# Patient Record
Sex: MALE | Race: WHITE | NOT HISPANIC OR LATINO | Employment: PART TIME | URBAN - METROPOLITAN AREA
[De-identification: names, ages, dates, MRNs, and addresses within clinical notes are randomized per-mention and may not be internally consistent; named-entity substitution may affect disease eponyms.]

---

## 2018-06-19 ENCOUNTER — NON-PROVIDER VISIT (OUTPATIENT)
Dept: URGENT CARE | Facility: CLINIC | Age: 28
End: 2018-06-19

## 2018-06-19 DIAGNOSIS — Z02.1 PRE-EMPLOYMENT DRUG SCREENING: ICD-10-CM

## 2018-06-19 LAB
AMP AMPHETAMINE: NORMAL
COC COCAINE: NORMAL
INT CON NEG: NORMAL
INT CON POS: NORMAL
MET METHAMPHETAMINES: NORMAL
OPI OPIATES: NORMAL
PCP PHENCYCLIDINE: NORMAL
POC DRUG COMMENT 753798-OCCUPATIONAL HEALTH: NEGATIVE
THC: NORMAL

## 2018-06-19 PROCEDURE — 80305 DRUG TEST PRSMV DIR OPT OBS: CPT | Performed by: PHYSICIAN ASSISTANT

## 2018-06-28 ENCOUNTER — HOSPITAL ENCOUNTER (OUTPATIENT)
Facility: MEDICAL CENTER | Age: 28
End: 2018-07-01
Attending: EMERGENCY MEDICINE | Admitting: INTERNAL MEDICINE
Payer: MEDICAID

## 2018-06-28 ENCOUNTER — APPOINTMENT (OUTPATIENT)
Dept: RADIOLOGY | Facility: MEDICAL CENTER | Age: 28
End: 2018-06-28
Attending: EMERGENCY MEDICINE
Payer: MEDICAID

## 2018-06-28 DIAGNOSIS — L02.91 ABSCESS: ICD-10-CM

## 2018-06-28 DIAGNOSIS — L03.116 CELLULITIS OF LEFT LOWER EXTREMITY: ICD-10-CM

## 2018-06-28 PROBLEM — L03.114 CELLULITIS OF LEFT UPPER EXTREMITY: Status: ACTIVE | Noted: 2018-06-28

## 2018-06-28 PROBLEM — M71.062 ABSCESS OF BURSA OF LEFT KNEE: Status: ACTIVE | Noted: 2018-06-28

## 2018-06-28 PROBLEM — E87.6 HYPOKALEMIA: Status: ACTIVE | Noted: 2018-06-28

## 2018-06-28 PROBLEM — D72.829 LEUKOCYTOSIS: Status: ACTIVE | Noted: 2018-06-28

## 2018-06-28 LAB
ALBUMIN SERPL BCP-MCNC: 4 G/DL (ref 3.2–4.9)
ALBUMIN/GLOB SERPL: 1.4 G/DL
ALP SERPL-CCNC: 82 U/L (ref 30–99)
ALT SERPL-CCNC: 13 U/L (ref 2–50)
AMPHET UR QL SCN: NEGATIVE
ANION GAP SERPL CALC-SCNC: 11 MMOL/L (ref 0–11.9)
APTT PPP: 27.1 SEC (ref 24.7–36)
AST SERPL-CCNC: 17 U/L (ref 12–45)
BARBITURATES UR QL SCN: NEGATIVE
BASOPHILS # BLD AUTO: 0.2 % (ref 0–1.8)
BASOPHILS # BLD: 0.02 K/UL (ref 0–0.12)
BENZODIAZ UR QL SCN: NEGATIVE
BILIRUB SERPL-MCNC: 0.5 MG/DL (ref 0.1–1.5)
BUN SERPL-MCNC: 17 MG/DL (ref 8–22)
BZE UR QL SCN: NEGATIVE
CALCIUM SERPL-MCNC: 8.8 MG/DL (ref 8.5–10.5)
CANNABINOIDS UR QL SCN: POSITIVE
CHLORIDE SERPL-SCNC: 108 MMOL/L (ref 96–112)
CO2 SERPL-SCNC: 24 MMOL/L (ref 20–33)
CREAT SERPL-MCNC: 0.9 MG/DL (ref 0.5–1.4)
CRP SERPL HS-MCNC: 0.78 MG/DL (ref 0–0.75)
EOSINOPHIL # BLD AUTO: 0.1 K/UL (ref 0–0.51)
EOSINOPHIL NFR BLD: 0.9 % (ref 0–6.9)
ERYTHROCYTE [DISTWIDTH] IN BLOOD BY AUTOMATED COUNT: 37.6 FL (ref 35.9–50)
ERYTHROCYTE [SEDIMENTATION RATE] IN BLOOD BY WESTERGREN METHOD: 9 MM/HOUR (ref 0–15)
GLOBULIN SER CALC-MCNC: 2.9 G/DL (ref 1.9–3.5)
GLUCOSE SERPL-MCNC: 98 MG/DL (ref 65–99)
HCT VFR BLD AUTO: 46.3 % (ref 42–52)
HGB BLD-MCNC: 16 G/DL (ref 14–18)
IMM GRANULOCYTES # BLD AUTO: 0.04 K/UL (ref 0–0.11)
IMM GRANULOCYTES NFR BLD AUTO: 0.4 % (ref 0–0.9)
INR PPP: 1.08 (ref 0.87–1.13)
LACTATE BLD-SCNC: 1.5 MMOL/L (ref 0.5–2)
LYMPHOCYTES # BLD AUTO: 2.04 K/UL (ref 1–4.8)
LYMPHOCYTES NFR BLD: 18.2 % (ref 22–41)
MCH RBC QN AUTO: 29.9 PG (ref 27–33)
MCHC RBC AUTO-ENTMCNC: 34.6 G/DL (ref 33.7–35.3)
MCV RBC AUTO: 86.5 FL (ref 81.4–97.8)
METHADONE UR QL SCN: NEGATIVE
MONOCYTES # BLD AUTO: 0.64 K/UL (ref 0–0.85)
MONOCYTES NFR BLD AUTO: 5.7 % (ref 0–13.4)
NEUTROPHILS # BLD AUTO: 8.37 K/UL (ref 1.82–7.42)
NEUTROPHILS NFR BLD: 74.6 % (ref 44–72)
NRBC # BLD AUTO: 0 K/UL
NRBC BLD-RTO: 0 /100 WBC
OPIATES UR QL SCN: NEGATIVE
OXYCODONE UR QL SCN: POSITIVE
PCP UR QL SCN: NEGATIVE
PLATELET # BLD AUTO: 266 K/UL (ref 164–446)
PMV BLD AUTO: 10.6 FL (ref 9–12.9)
POTASSIUM SERPL-SCNC: 3.3 MMOL/L (ref 3.6–5.5)
PROPOXYPH UR QL SCN: NEGATIVE
PROT SERPL-MCNC: 6.9 G/DL (ref 6–8.2)
PROTHROMBIN TIME: 13.7 SEC (ref 12–14.6)
RBC # BLD AUTO: 5.35 M/UL (ref 4.7–6.1)
SODIUM SERPL-SCNC: 143 MMOL/L (ref 135–145)
WBC # BLD AUTO: 11.2 K/UL (ref 4.8–10.8)

## 2018-06-28 PROCEDURE — 87070 CULTURE OTHR SPECIMN AEROBIC: CPT

## 2018-06-28 PROCEDURE — 96365 THER/PROPH/DIAG IV INF INIT: CPT

## 2018-06-28 PROCEDURE — 83605 ASSAY OF LACTIC ACID: CPT

## 2018-06-28 PROCEDURE — G0378 HOSPITAL OBSERVATION PER HR: HCPCS

## 2018-06-28 PROCEDURE — 85610 PROTHROMBIN TIME: CPT

## 2018-06-28 PROCEDURE — 86140 C-REACTIVE PROTEIN: CPT

## 2018-06-28 PROCEDURE — 80053 COMPREHEN METABOLIC PANEL: CPT

## 2018-06-28 PROCEDURE — 700105 HCHG RX REV CODE 258: Performed by: EMERGENCY MEDICINE

## 2018-06-28 PROCEDURE — 85730 THROMBOPLASTIN TIME PARTIAL: CPT

## 2018-06-28 PROCEDURE — 73562 X-RAY EXAM OF KNEE 3: CPT | Mod: LT

## 2018-06-28 PROCEDURE — 99285 EMERGENCY DEPT VISIT HI MDM: CPT

## 2018-06-28 PROCEDURE — 96375 TX/PRO/DX INJ NEW DRUG ADDON: CPT

## 2018-06-28 PROCEDURE — 87205 SMEAR GRAM STAIN: CPT

## 2018-06-28 PROCEDURE — 85652 RBC SED RATE AUTOMATED: CPT

## 2018-06-28 PROCEDURE — 96367 TX/PROPH/DG ADDL SEQ IV INF: CPT

## 2018-06-28 PROCEDURE — 36415 COLL VENOUS BLD VENIPUNCTURE: CPT

## 2018-06-28 PROCEDURE — 85025 COMPLETE CBC W/AUTO DIFF WBC: CPT

## 2018-06-28 PROCEDURE — 700111 HCHG RX REV CODE 636 W/ 250 OVERRIDE (IP): Performed by: EMERGENCY MEDICINE

## 2018-06-28 PROCEDURE — 80307 DRUG TEST PRSMV CHEM ANLYZR: CPT

## 2018-06-28 PROCEDURE — 87077 CULTURE AEROBIC IDENTIFY: CPT

## 2018-06-28 PROCEDURE — 700105 HCHG RX REV CODE 258: Performed by: INTERNAL MEDICINE

## 2018-06-28 PROCEDURE — 99220 PR INITIAL OBSERVATION CARE,LEVL III: CPT | Performed by: INTERNAL MEDICINE

## 2018-06-28 PROCEDURE — 87040 BLOOD CULTURE FOR BACTERIA: CPT

## 2018-06-28 PROCEDURE — 87186 SC STD MICRODIL/AGAR DIL: CPT

## 2018-06-28 RX ORDER — ONDANSETRON 2 MG/ML
4 INJECTION INTRAMUSCULAR; INTRAVENOUS EVERY 4 HOURS PRN
Status: DISCONTINUED | OUTPATIENT
Start: 2018-06-28 | End: 2018-07-01 | Stop reason: HOSPADM

## 2018-06-28 RX ORDER — ONDANSETRON 4 MG/1
4 TABLET, ORALLY DISINTEGRATING ORAL EVERY 4 HOURS PRN
Status: DISCONTINUED | OUTPATIENT
Start: 2018-06-28 | End: 2018-07-01 | Stop reason: HOSPADM

## 2018-06-28 RX ORDER — BISACODYL 10 MG
10 SUPPOSITORY, RECTAL RECTAL
Status: DISCONTINUED | OUTPATIENT
Start: 2018-06-28 | End: 2018-06-29

## 2018-06-28 RX ORDER — AMOXICILLIN 250 MG
2 CAPSULE ORAL 2 TIMES DAILY
Status: DISCONTINUED | OUTPATIENT
Start: 2018-06-29 | End: 2018-06-29

## 2018-06-28 RX ORDER — SODIUM CHLORIDE 9 MG/ML
500 INJECTION, SOLUTION INTRAVENOUS
Status: DISCONTINUED | OUTPATIENT
Start: 2018-06-28 | End: 2018-07-01 | Stop reason: HOSPADM

## 2018-06-28 RX ORDER — SODIUM CHLORIDE 9 MG/ML
30 INJECTION, SOLUTION INTRAVENOUS
Status: DISCONTINUED | OUTPATIENT
Start: 2018-06-28 | End: 2018-07-01 | Stop reason: HOSPADM

## 2018-06-28 RX ORDER — PROMETHAZINE HYDROCHLORIDE 12.5 MG/1
12.5-25 SUPPOSITORY RECTAL EVERY 4 HOURS PRN
Status: DISCONTINUED | OUTPATIENT
Start: 2018-06-28 | End: 2018-07-01 | Stop reason: HOSPADM

## 2018-06-28 RX ORDER — SODIUM CHLORIDE 9 MG/ML
INJECTION, SOLUTION INTRAVENOUS CONTINUOUS
Status: DISCONTINUED | OUTPATIENT
Start: 2018-06-28 | End: 2018-06-30

## 2018-06-28 RX ORDER — ACETAMINOPHEN 325 MG/1
650 TABLET ORAL EVERY 6 HOURS PRN
Status: DISCONTINUED | OUTPATIENT
Start: 2018-06-28 | End: 2018-06-29

## 2018-06-28 RX ORDER — LIDOCAINE HYDROCHLORIDE AND EPINEPHRINE 10; 10 MG/ML; UG/ML
20 INJECTION, SOLUTION INFILTRATION; PERINEURAL ONCE
Status: DISCONTINUED | OUTPATIENT
Start: 2018-06-28 | End: 2018-06-29

## 2018-06-28 RX ORDER — HYDROCODONE BITARTRATE AND ACETAMINOPHEN 5; 325 MG/1; MG/1
1-2 TABLET ORAL EVERY 6 HOURS PRN
Status: DISCONTINUED | OUTPATIENT
Start: 2018-06-28 | End: 2018-07-01 | Stop reason: HOSPADM

## 2018-06-28 RX ORDER — POLYETHYLENE GLYCOL 3350 17 G/17G
1 POWDER, FOR SOLUTION ORAL
Status: DISCONTINUED | OUTPATIENT
Start: 2018-06-28 | End: 2018-06-29

## 2018-06-28 RX ORDER — PROMETHAZINE HYDROCHLORIDE 25 MG/1
12.5-25 TABLET ORAL EVERY 4 HOURS PRN
Status: DISCONTINUED | OUTPATIENT
Start: 2018-06-28 | End: 2018-07-01 | Stop reason: HOSPADM

## 2018-06-28 RX ORDER — MORPHINE SULFATE 10 MG/ML
8 INJECTION, SOLUTION INTRAMUSCULAR; INTRAVENOUS ONCE
Status: COMPLETED | OUTPATIENT
Start: 2018-06-28 | End: 2018-06-28

## 2018-06-28 RX ADMIN — MORPHINE SULFATE 8 MG: 10 INJECTION INTRAVENOUS at 21:11

## 2018-06-28 RX ADMIN — VANCOMYCIN HYDROCHLORIDE 2100 MG: 100 INJECTION, POWDER, LYOPHILIZED, FOR SOLUTION INTRAVENOUS at 23:12

## 2018-06-28 RX ADMIN — SODIUM CHLORIDE: 9 INJECTION, SOLUTION INTRAVENOUS at 23:33

## 2018-06-28 RX ADMIN — SODIUM CHLORIDE 3 G: 900 INJECTION INTRAVENOUS at 21:10

## 2018-06-28 ASSESSMENT — COPD QUESTIONNAIRES
DO YOU EVER COUGH UP ANY MUCUS OR PHLEGM?: NO/ONLY WITH OCCASIONAL COLDS OR INFECTIONS
HAVE YOU SMOKED AT LEAST 100 CIGARETTES IN YOUR ENTIRE LIFE: NO/DON'T KNOW
DURING THE PAST 4 WEEKS HOW MUCH DID YOU FEEL SHORT OF BREATH: NONE/LITTLE OF THE TIME
COPD SCREENING SCORE: 0

## 2018-06-28 ASSESSMENT — ENCOUNTER SYMPTOMS
BACK PAIN: 0
SPUTUM PRODUCTION: 0
FEVER: 1
HEADACHES: 0
CHILLS: 1
NECK PAIN: 0
BRUISES/BLEEDS EASILY: 0
FLANK PAIN: 0
ABDOMINAL PAIN: 0
COUGH: 0
BLURRED VISION: 0
DIARRHEA: 0
VOMITING: 0
PALPITATIONS: 0
DIZZINESS: 0
SHORTNESS OF BREATH: 0
WHEEZING: 0
BLOOD IN STOOL: 0
SORE THROAT: 0
NAUSEA: 0
FOCAL WEAKNESS: 0
MYALGIAS: 0
DIAPHORESIS: 0
SEIZURES: 0

## 2018-06-28 ASSESSMENT — LIFESTYLE VARIABLES: EVER_SMOKED: NEVER

## 2018-06-28 ASSESSMENT — PAIN SCALES - GENERAL: PAINLEVEL_OUTOF10: 7

## 2018-06-29 PROBLEM — L02.416 ABSCESS OF LEFT KNEE: Status: ACTIVE | Noted: 2018-06-28

## 2018-06-29 LAB
GRAM STN SPEC: NORMAL
GRAM STN SPEC: NORMAL
SIGNIFICANT IND 70042: NORMAL
SIGNIFICANT IND 70042: NORMAL
SITE SITE: NORMAL
SITE SITE: NORMAL
SOURCE SOURCE: NORMAL
SOURCE SOURCE: NORMAL

## 2018-06-29 PROCEDURE — A9270 NON-COVERED ITEM OR SERVICE: HCPCS

## 2018-06-29 PROCEDURE — 700111 HCHG RX REV CODE 636 W/ 250 OVERRIDE (IP)

## 2018-06-29 PROCEDURE — 160035 HCHG PACU - 1ST 60 MINS PHASE I: Performed by: ORTHOPAEDIC SURGERY

## 2018-06-29 PROCEDURE — 87075 CULTR BACTERIA EXCEPT BLOOD: CPT

## 2018-06-29 PROCEDURE — A9270 NON-COVERED ITEM OR SERVICE: HCPCS | Performed by: INTERNAL MEDICINE

## 2018-06-29 PROCEDURE — 99225 PR SUBSEQUENT OBSERVATION CARE,LEVEL II: CPT | Performed by: HOSPITALIST

## 2018-06-29 PROCEDURE — 160009 HCHG ANES TIME/MIN: Performed by: ORTHOPAEDIC SURGERY

## 2018-06-29 PROCEDURE — 700101 HCHG RX REV CODE 250

## 2018-06-29 PROCEDURE — A9270 NON-COVERED ITEM OR SERVICE: HCPCS | Performed by: ORTHOPAEDIC SURGERY

## 2018-06-29 PROCEDURE — 160002 HCHG RECOVERY MINUTES (STAT): Performed by: ORTHOPAEDIC SURGERY

## 2018-06-29 PROCEDURE — 87205 SMEAR GRAM STAIN: CPT

## 2018-06-29 PROCEDURE — 160048 HCHG OR STATISTICAL LEVEL 1-5: Performed by: ORTHOPAEDIC SURGERY

## 2018-06-29 PROCEDURE — 700111 HCHG RX REV CODE 636 W/ 250 OVERRIDE (IP): Performed by: INTERNAL MEDICINE

## 2018-06-29 PROCEDURE — 96376 TX/PRO/DX INJ SAME DRUG ADON: CPT

## 2018-06-29 PROCEDURE — 87070 CULTURE OTHR SPECIMN AEROBIC: CPT

## 2018-06-29 PROCEDURE — 700112 HCHG RX REV CODE 229: Performed by: ORTHOPAEDIC SURGERY

## 2018-06-29 PROCEDURE — 700102 HCHG RX REV CODE 250 W/ 637 OVERRIDE(OP): Performed by: ORTHOPAEDIC SURGERY

## 2018-06-29 PROCEDURE — G0378 HOSPITAL OBSERVATION PER HR: HCPCS

## 2018-06-29 PROCEDURE — 700111 HCHG RX REV CODE 636 W/ 250 OVERRIDE (IP): Performed by: ORTHOPAEDIC SURGERY

## 2018-06-29 PROCEDURE — 500891 HCHG PACK, ORTHO MAJOR: Performed by: ORTHOPAEDIC SURGERY

## 2018-06-29 PROCEDURE — 501838 HCHG SUTURE GENERAL: Performed by: ORTHOPAEDIC SURGERY

## 2018-06-29 PROCEDURE — 90715 TDAP VACCINE 7 YRS/> IM: CPT | Performed by: HOSPITALIST

## 2018-06-29 PROCEDURE — 160027 HCHG SURGERY MINUTES - 1ST 30 MINS LEVEL 2: Performed by: ORTHOPAEDIC SURGERY

## 2018-06-29 PROCEDURE — 87389 HIV-1 AG W/HIV-1&-2 AB AG IA: CPT

## 2018-06-29 PROCEDURE — 99245 OFF/OP CONSLTJ NEW/EST HI 55: CPT | Performed by: INTERNAL MEDICINE

## 2018-06-29 PROCEDURE — 700102 HCHG RX REV CODE 250 W/ 637 OVERRIDE(OP): Performed by: INTERNAL MEDICINE

## 2018-06-29 PROCEDURE — 700102 HCHG RX REV CODE 250 W/ 637 OVERRIDE(OP)

## 2018-06-29 PROCEDURE — 700111 HCHG RX REV CODE 636 W/ 250 OVERRIDE (IP): Performed by: HOSPITALIST

## 2018-06-29 PROCEDURE — 700105 HCHG RX REV CODE 258: Performed by: INTERNAL MEDICINE

## 2018-06-29 PROCEDURE — 96366 THER/PROPH/DIAG IV INF ADDON: CPT

## 2018-06-29 PROCEDURE — 96375 TX/PRO/DX INJ NEW DRUG ADDON: CPT

## 2018-06-29 PROCEDURE — 160036 HCHG PACU - EA ADDL 30 MINS PHASE I: Performed by: ORTHOPAEDIC SURGERY

## 2018-06-29 PROCEDURE — 36415 COLL VENOUS BLD VENIPUNCTURE: CPT

## 2018-06-29 PROCEDURE — 87015 SPECIMEN INFECT AGNT CONCNTJ: CPT

## 2018-06-29 PROCEDURE — 90471 IMMUNIZATION ADMIN: CPT

## 2018-06-29 PROCEDURE — 86803 HEPATITIS C AB TEST: CPT

## 2018-06-29 RX ORDER — MORPHINE SULFATE 4 MG/ML
4 INJECTION, SOLUTION INTRAMUSCULAR; INTRAVENOUS EVERY 4 HOURS PRN
Status: DISCONTINUED | OUTPATIENT
Start: 2018-06-29 | End: 2018-07-01 | Stop reason: HOSPADM

## 2018-06-29 RX ORDER — MAGNESIUM HYDROXIDE 1200 MG/15ML
LIQUID ORAL
Status: COMPLETED | OUTPATIENT
Start: 2018-06-29 | End: 2018-06-29

## 2018-06-29 RX ORDER — DIPHENHYDRAMINE HYDROCHLORIDE 50 MG/ML
25 INJECTION INTRAMUSCULAR; INTRAVENOUS EVERY 6 HOURS PRN
Status: DISCONTINUED | OUTPATIENT
Start: 2018-06-29 | End: 2018-07-01 | Stop reason: HOSPADM

## 2018-06-29 RX ORDER — KETOROLAC TROMETHAMINE 30 MG/ML
30 INJECTION, SOLUTION INTRAMUSCULAR; INTRAVENOUS EVERY 6 HOURS
Status: DISCONTINUED | OUTPATIENT
Start: 2018-06-29 | End: 2018-07-01 | Stop reason: HOSPADM

## 2018-06-29 RX ORDER — SCOLOPAMINE TRANSDERMAL SYSTEM 1 MG/1
1 PATCH, EXTENDED RELEASE TRANSDERMAL
Status: DISCONTINUED | OUTPATIENT
Start: 2018-06-29 | End: 2018-07-01 | Stop reason: HOSPADM

## 2018-06-29 RX ORDER — AMOXICILLIN 250 MG
1 CAPSULE ORAL
Status: DISCONTINUED | OUTPATIENT
Start: 2018-06-29 | End: 2018-07-01 | Stop reason: HOSPADM

## 2018-06-29 RX ORDER — OXYCODONE HCL 5 MG/5 ML
SOLUTION, ORAL ORAL
Status: COMPLETED
Start: 2018-06-29 | End: 2018-06-29

## 2018-06-29 RX ORDER — BISACODYL 10 MG
10 SUPPOSITORY, RECTAL RECTAL
Status: DISCONTINUED | OUTPATIENT
Start: 2018-06-29 | End: 2018-07-01 | Stop reason: HOSPADM

## 2018-06-29 RX ORDER — AMOXICILLIN 250 MG
1 CAPSULE ORAL NIGHTLY
Status: DISCONTINUED | OUTPATIENT
Start: 2018-06-29 | End: 2018-07-01 | Stop reason: HOSPADM

## 2018-06-29 RX ORDER — POLYETHYLENE GLYCOL 3350 17 G/17G
1 POWDER, FOR SOLUTION ORAL 2 TIMES DAILY PRN
Status: DISCONTINUED | OUTPATIENT
Start: 2018-06-29 | End: 2018-07-01 | Stop reason: HOSPADM

## 2018-06-29 RX ORDER — ENEMA 19; 7 G/133ML; G/133ML
1 ENEMA RECTAL
Status: DISCONTINUED | OUTPATIENT
Start: 2018-06-29 | End: 2018-07-01 | Stop reason: HOSPADM

## 2018-06-29 RX ORDER — ACETAMINOPHEN 325 MG/1
650 TABLET ORAL EVERY 6 HOURS
Status: DISCONTINUED | OUTPATIENT
Start: 2018-06-29 | End: 2018-07-01 | Stop reason: HOSPADM

## 2018-06-29 RX ORDER — ONDANSETRON 2 MG/ML
4 INJECTION INTRAMUSCULAR; INTRAVENOUS EVERY 4 HOURS PRN
Status: DISCONTINUED | OUTPATIENT
Start: 2018-06-29 | End: 2018-06-29

## 2018-06-29 RX ORDER — OXYCODONE HYDROCHLORIDE 5 MG/1
5 TABLET ORAL
Status: DISCONTINUED | OUTPATIENT
Start: 2018-06-29 | End: 2018-07-01 | Stop reason: HOSPADM

## 2018-06-29 RX ORDER — HALOPERIDOL 5 MG/ML
1 INJECTION INTRAMUSCULAR EVERY 6 HOURS PRN
Status: DISCONTINUED | OUTPATIENT
Start: 2018-06-29 | End: 2018-07-01 | Stop reason: HOSPADM

## 2018-06-29 RX ORDER — DOCUSATE SODIUM 100 MG/1
100 CAPSULE, LIQUID FILLED ORAL 2 TIMES DAILY
Status: DISCONTINUED | OUTPATIENT
Start: 2018-06-29 | End: 2018-07-01 | Stop reason: HOSPADM

## 2018-06-29 RX ORDER — DEXAMETHASONE SODIUM PHOSPHATE 4 MG/ML
4 INJECTION, SOLUTION INTRA-ARTICULAR; INTRALESIONAL; INTRAMUSCULAR; INTRAVENOUS; SOFT TISSUE
Status: DISCONTINUED | OUTPATIENT
Start: 2018-06-29 | End: 2018-07-01 | Stop reason: HOSPADM

## 2018-06-29 RX ORDER — OXYCODONE HYDROCHLORIDE 10 MG/1
10 TABLET ORAL
Status: DISCONTINUED | OUTPATIENT
Start: 2018-06-29 | End: 2018-07-01 | Stop reason: HOSPADM

## 2018-06-29 RX ADMIN — AMPICILLIN SODIUM AND SULBACTAM SODIUM 3 G: 2; 1 INJECTION, POWDER, FOR SOLUTION INTRAMUSCULAR; INTRAVENOUS at 14:33

## 2018-06-29 RX ADMIN — HYDROCODONE BITARTRATE AND ACETAMINOPHEN 2 TABLET: 5; 325 TABLET ORAL at 06:56

## 2018-06-29 RX ADMIN — OXYCODONE HYDROCHLORIDE 10 MG: 5 SOLUTION ORAL at 10:40

## 2018-06-29 RX ADMIN — KETOROLAC TROMETHAMINE 30 MG: 30 INJECTION, SOLUTION INTRAMUSCULAR at 18:00

## 2018-06-29 RX ADMIN — FENTANYL CITRATE 50 MCG: 50 INJECTION, SOLUTION INTRAMUSCULAR; INTRAVENOUS at 10:40

## 2018-06-29 RX ADMIN — HYDROCODONE BITARTRATE AND ACETAMINOPHEN 2 TABLET: 5; 325 TABLET ORAL at 00:36

## 2018-06-29 RX ADMIN — AMPICILLIN SODIUM AND SULBACTAM SODIUM 3 G: 2; 1 INJECTION, POWDER, FOR SOLUTION INTRAMUSCULAR; INTRAVENOUS at 02:59

## 2018-06-29 RX ADMIN — CLOSTRIDIUM TETANI TOXOID ANTIGEN (FORMALDEHYDE INACTIVATED), CORYNEBACTERIUM DIPHTHERIAE TOXOID ANTIGEN (FORMALDEHYDE INACTIVATED), BORDETELLA PERTUSSIS TOXOID ANTIGEN (GLUTARALDEHYDE INACTIVATED), BORDETELLA PERTUSSIS FILAMENTOUS HEMAGGLUTININ ANTIGEN (FORMALDEHYDE INACTIVATED), BORDETELLA PERTUSSIS PERTACTIN ANTIGEN, AND BORDETELLA PERTUSSIS FIMBRIAE 2/3 ANTIGEN 0.5 ML: 5; 2; 2.5; 5; 3; 5 INJECTION, SUSPENSION INTRAMUSCULAR at 22:13

## 2018-06-29 RX ADMIN — HYDROMORPHONE HYDROCHLORIDE 0.5 MG: 10 INJECTION, SOLUTION INTRAMUSCULAR; INTRAVENOUS; SUBCUTANEOUS at 10:57

## 2018-06-29 RX ADMIN — OXYCODONE HYDROCHLORIDE 10 MG: 10 TABLET ORAL at 23:15

## 2018-06-29 RX ADMIN — VANCOMYCIN HYDROCHLORIDE 1300 MG: 100 INJECTION, POWDER, LYOPHILIZED, FOR SOLUTION INTRAVENOUS at 15:09

## 2018-06-29 RX ADMIN — MORPHINE SULFATE 4 MG: 4 INJECTION INTRAVENOUS at 03:45

## 2018-06-29 RX ADMIN — ACETAMINOPHEN 650 MG: 325 TABLET, FILM COATED ORAL at 23:15

## 2018-06-29 RX ADMIN — KETOROLAC TROMETHAMINE 30 MG: 30 INJECTION, SOLUTION INTRAMUSCULAR at 13:13

## 2018-06-29 RX ADMIN — OXYCODONE HYDROCHLORIDE 10 MG: 10 TABLET ORAL at 17:04

## 2018-06-29 RX ADMIN — VANCOMYCIN HYDROCHLORIDE 1300 MG: 100 INJECTION, POWDER, LYOPHILIZED, FOR SOLUTION INTRAVENOUS at 05:54

## 2018-06-29 RX ADMIN — STANDARDIZED SENNA CONCENTRATE AND DOCUSATE SODIUM 1 TABLET: 8.6; 5 TABLET, FILM COATED ORAL at 19:53

## 2018-06-29 RX ADMIN — ACETAMINOPHEN 650 MG: 325 TABLET, FILM COATED ORAL at 13:13

## 2018-06-29 RX ADMIN — AMPICILLIN SODIUM AND SULBACTAM SODIUM 3 G: 2; 1 INJECTION, POWDER, FOR SOLUTION INTRAMUSCULAR; INTRAVENOUS at 19:53

## 2018-06-29 RX ADMIN — VANCOMYCIN HYDROCHLORIDE 1300 MG: 100 INJECTION, POWDER, LYOPHILIZED, FOR SOLUTION INTRAVENOUS at 22:13

## 2018-06-29 RX ADMIN — OXYCODONE HYDROCHLORIDE 10 MG: 10 TABLET ORAL at 12:25

## 2018-06-29 RX ADMIN — ACETAMINOPHEN 650 MG: 325 TABLET, FILM COATED ORAL at 18:00

## 2018-06-29 RX ADMIN — FENTANYL CITRATE 50 MCG: 50 INJECTION, SOLUTION INTRAMUSCULAR; INTRAVENOUS at 10:50

## 2018-06-29 RX ADMIN — DOCUSATE SODIUM 100 MG: 100 CAPSULE ORAL at 13:13

## 2018-06-29 RX ADMIN — DOCUSATE SODIUM 100 MG: 100 CAPSULE ORAL at 19:53

## 2018-06-29 RX ADMIN — OXYCODONE HYDROCHLORIDE 10 MG: 10 TABLET ORAL at 19:54

## 2018-06-29 RX ADMIN — KETOROLAC TROMETHAMINE 30 MG: 30 INJECTION, SOLUTION INTRAMUSCULAR at 23:15

## 2018-06-29 RX ADMIN — HYDROMORPHONE HYDROCHLORIDE 0.5 MG: 10 INJECTION, SOLUTION INTRAMUSCULAR; INTRAVENOUS; SUBCUTANEOUS at 10:45

## 2018-06-29 ASSESSMENT — LIFESTYLE VARIABLES
TOTAL SCORE: 0
EVER HAD A DRINK FIRST THING IN THE MORNING TO STEADY YOUR NERVES TO GET RID OF A HANGOVER: NO
HAVE PEOPLE ANNOYED YOU BY CRITICIZING YOUR DRINKING: NO
HAVE YOU EVER FELT YOU SHOULD CUT DOWN ON YOUR DRINKING: NO
ALCOHOL_USE: YES
ALCOHOL_USE: YES
TOTAL SCORE: 0
CONSUMPTION TOTAL: INCOMPLETE
TOTAL SCORE: 0
SUBSTANCE_ABUSE: 0
HOW MANY TIMES IN THE PAST YEAR HAVE YOU HAD 5 OR MORE DRINKS IN A DAY: 0
EVER HAD A DRINK FIRST THING IN THE MORNING TO STEADY YOUR NERVES TO GET RID OF A HANGOVER: NO
EVER FELT BAD OR GUILTY ABOUT YOUR DRINKING: NO
EVER FELT BAD OR GUILTY ABOUT YOUR DRINKING: NO
CONSUMPTION TOTAL: NEGATIVE
HAVE YOU EVER FELT YOU SHOULD CUT DOWN ON YOUR DRINKING: NO
ON A TYPICAL DAY WHEN YOU DRINK ALCOHOL HOW MANY DRINKS DO YOU HAVE: 1
ON A TYPICAL DAY WHEN YOU DRINK ALCOHOL HOW MANY DRINKS DO YOU HAVE: 1
AVERAGE NUMBER OF DAYS PER WEEK YOU HAVE A DRINK CONTAINING ALCOHOL: 0
TOTAL SCORE: 0
HAVE PEOPLE ANNOYED YOU BY CRITICIZING YOUR DRINKING: NO

## 2018-06-29 ASSESSMENT — PATIENT HEALTH QUESTIONNAIRE - PHQ9
SUM OF ALL RESPONSES TO PHQ9 QUESTIONS 1 AND 2: 0
SUM OF ALL RESPONSES TO PHQ9 QUESTIONS 1 AND 2: 0
1. LITTLE INTEREST OR PLEASURE IN DOING THINGS: NOT AT ALL
2. FEELING DOWN, DEPRESSED, IRRITABLE, OR HOPELESS: NOT AT ALL
1. LITTLE INTEREST OR PLEASURE IN DOING THINGS: NOT AT ALL
2. FEELING DOWN, DEPRESSED, IRRITABLE, OR HOPELESS: NOT AT ALL

## 2018-06-29 ASSESSMENT — PAIN SCALES - GENERAL
PAINLEVEL_OUTOF10: 4
PAINLEVEL_OUTOF10: 8
PAINLEVEL_OUTOF10: 8
PAINLEVEL_OUTOF10: 9
PAINLEVEL_OUTOF10: 7
PAINLEVEL_OUTOF10: 5
PAINLEVEL_OUTOF10: 4
PAINLEVEL_OUTOF10: 3
PAINLEVEL_OUTOF10: 7
PAINLEVEL_OUTOF10: 3
PAINLEVEL_OUTOF10: 0
PAINLEVEL_OUTOF10: 7
PAINLEVEL_OUTOF10: 8
PAINLEVEL_OUTOF10: 7
PAINLEVEL_OUTOF10: 3
PAINLEVEL_OUTOF10: 5
PAINLEVEL_OUTOF10: 0
PAINLEVEL_OUTOF10: 7
PAINLEVEL_OUTOF10: 0

## 2018-06-29 ASSESSMENT — ENCOUNTER SYMPTOMS
DIARRHEA: 0
SORE THROAT: 0
SHORTNESS OF BREATH: 0
ABDOMINAL PAIN: 0
NECK PAIN: 0
EYE PAIN: 0
HEADACHES: 0
DIAPHORESIS: 0
COUGH: 0
NAUSEA: 0
SENSORY CHANGE: 0
CONSTIPATION: 0
VOMITING: 0
DIZZINESS: 0
SPUTUM PRODUCTION: 0
CHILLS: 0
SPEECH CHANGE: 0
FEVER: 0
CLAUDICATION: 0
HEMOPTYSIS: 0
FOCAL WEAKNESS: 0
WHEEZING: 0
MYALGIAS: 0
DEPRESSION: 0
LOSS OF CONSCIOUSNESS: 0
EYE DISCHARGE: 0
PALPITATIONS: 0
BRUISES/BLEEDS EASILY: 0
WEAKNESS: 0
BACK PAIN: 0

## 2018-06-29 ASSESSMENT — COGNITIVE AND FUNCTIONAL STATUS - GENERAL
SUGGESTED CMS G CODE MODIFIER MOBILITY: CH
DAILY ACTIVITIY SCORE: 24
MOBILITY SCORE: 24
SUGGESTED CMS G CODE MODIFIER DAILY ACTIVITY: CH

## 2018-06-29 ASSESSMENT — COPD QUESTIONNAIRES
DURING THE PAST 4 WEEKS HOW MUCH DID YOU FEEL SHORT OF BREATH: NONE/LITTLE OF THE TIME
COPD SCREENING SCORE: 0
DO YOU EVER COUGH UP ANY MUCUS OR PHLEGM?: NO/ONLY WITH OCCASIONAL COLDS OR INFECTIONS
HAVE YOU SMOKED AT LEAST 100 CIGARETTES IN YOUR ENTIRE LIFE: NO/DON'T KNOW
IN THE PAST 12 MONTHS DO YOU DO LESS THAN YOU USED TO BECAUSE OF YOUR BREATHING PROBLEMS: DISAGREE/UNSURE

## 2018-06-29 NOTE — ED NOTES
Pt may need social service consult related to financial assistance, pt may need MD to call work to explain need for hospitalization. Admitting MD aware of pt's work situation, need for MD note and possible phone call.

## 2018-06-29 NOTE — ED NOTES
Pt is very concerned about missing work tomorrow and the expense of hospitalization. Pt is beginning a new job and has a 1 hour mandatory orientation tomorrow, he has medicaid but is unsure how much of his current hospitalization will cost. Pt denied offer for social work consult. Pt has met with admitting MDPako.

## 2018-06-29 NOTE — ASSESSMENT & PLAN NOTE
Mild at 11.2 likely secondary to abscess  Continue to monitor vitals and CBC to watch for progression to full sepsis

## 2018-06-29 NOTE — WOUND TEAM
Patient's abscess was surgically debrided today, and per Dr. Ballard's note was closed. Please consult Wound Team again if Dr. Ballard wishes for Wound Team to manage incision. Consult completed.

## 2018-06-29 NOTE — PROGRESS NOTES
Patient currently in recovery, to surgery this am via bed. PO and IV pain medications prior to surgery.

## 2018-06-29 NOTE — ED NOTES
x2 IV's inserted, x2 BC, rainbow, urine collected, sent. Pt medicated for pain, antibiotics infusing.

## 2018-06-29 NOTE — PROGRESS NOTES
Patient back from surgery via bed. Left knee with dressing intact, patient removed dressing to take photo of surgery site. Clean dry dressing replaced, and covered with ace wrap. Patient anxious about seeing his kids. He has visitation to see them today at 1700, voiced wanting to go to them, I spoke with , supervisor on orthopedics about this situation. Patient was told he cannot leave and come back. If he leaves it would be considered an AMA. Patient is calling his  to see if kids can come see him at [facility].

## 2018-06-29 NOTE — ED TRIAGE NOTES
Gera Benedict  28 y.o.  male  Chief Complaint   Patient presents with   • Wound Infection     left knee      Present to triage c/o wound left knee. Per patient he thinks he was bitten by a spider 3 days ago. Multiple wounds also noted on patient's forearms. Redness and swelling noted. Dx hx of MRSA.

## 2018-06-29 NOTE — H&P
Hospital Medicine History and Physical    Date of Service  6/28/2018    Chief Complaint  Chief Complaint   Patient presents with   • Wound Infection     left knee        History of Presenting Illness  28 y.o. male who presented 6/28/2018 with left knee swelling for the past 3 days.  Patient reported increasing swelling, redness of his left lateral knee which eventually ruptured with purulent drainage.  The patient also reports multiple smaller abscesses on his arms which she has been picking it with minimal drainage and redness.  The patient denies using IV drugs.  He reports fevers and chills.  He denies any headache, chest pain, shortness of breath, abdominal pain, diarrhea or dysuria.       Primary Care Physician  Pcp Pt States None    Consultants  Ortho Dr Tipton    Code Status  Full Code    Review of Systems  Review of Systems   Constitutional: Positive for chills and fever. Negative for diaphoresis.   HENT: Negative for hearing loss and sore throat.    Eyes: Negative for blurred vision.   Respiratory: Negative for cough, sputum production, shortness of breath and wheezing.    Cardiovascular: Negative for chest pain, palpitations and leg swelling.   Gastrointestinal: Negative for abdominal pain, blood in stool, diarrhea, nausea and vomiting.   Genitourinary: Negative for dysuria, flank pain and urgency.   Musculoskeletal: Negative for back pain, joint pain, myalgias and neck pain.   Skin:        Abscess on left knee   Neurological: Negative for dizziness, focal weakness, seizures and headaches.   Endo/Heme/Allergies: Does not bruise/bleed easily.   Psychiatric/Behavioral: Negative for suicidal ideas.   All other systems reviewed and are negative.       Past Medical History  Past Medical History:   Diagnosis Date   • Chronic back pain        Surgical History  No pertinent surgical history    Medications  No current facility-administered medications on file prior to encounter.      No current outpatient  prescriptions on file prior to encounter.     None  Family History  Family History   Problem Relation Age of Onset   • Other Mother      unknown   • Other Father      unknown       Social History  Social History   Substance Use Topics   • Smoking status: Current Some Day Smoker   • Smokeless tobacco: Never Used      Comment: avoid all tobacco products   • Alcohol use 2.0 oz/week     4 Cans of beer per week       Allergies  No Known Allergies     Physical Exam  Laboratory   Hemodynamics  Temp (24hrs), Av.6 °C (97.9 °F), Min:36.6 °C (97.9 °F), Max:36.6 °C (97.9 °F)   Temperature: 36.6 °C (97.9 °F)  Pulse  Av.3  Min: 92  Max: 95 Heart Rate (Monitored): 96  Blood Pressure: 130/64, NIBP: 132/51      Respiratory      Respiration: 18, Pulse Oximetry: 95 %             Physical Exam   Constitutional: He is oriented to person, place, and time. He appears well-developed and well-nourished. No distress.   HENT:   Head: Normocephalic and atraumatic.   Mouth/Throat: Oropharynx is clear and moist.   Eyes: Conjunctivae are normal. Pupils are equal, round, and reactive to light. No scleral icterus.   Neck: Normal range of motion. Neck supple.   Cardiovascular: Regular rhythm and normal heart sounds.    Tachycardic   Pulmonary/Chest: Effort normal and breath sounds normal. No respiratory distress. He has no wheezes. He has no rales.   Abdominal: Soft. Bowel sounds are normal. He exhibits no distension. There is no tenderness. There is no rebound.   Musculoskeletal: Normal range of motion. He exhibits tenderness (Left knee). He exhibits no edema.   Lymphadenopathy:     He has no cervical adenopathy.   Neurological: He is alert and oriented to person, place, and time. No cranial nerve deficit. Coordination normal.   Skin: Skin is warm. There is erythema.   5 x 6 cm abscess on lateral aspect of left knee with surrounding erythema and purulent drainage    Multiple smaller maculopapular lesions with surrounding erythema on left  forearm   Psychiatric: He has a normal mood and affect. His behavior is normal.   Nursing note and vitals reviewed.      Recent Labs      06/28/18 2019   WBC  11.2*   RBC  5.35   HEMOGLOBIN  16.0   HEMATOCRIT  46.3   MCV  86.5   MCH  29.9   MCHC  34.6   RDW  37.6   PLATELETCT  266   MPV  10.6     Recent Labs      06/28/18 2019   SODIUM  143   POTASSIUM  3.3*   CHLORIDE  108   CO2  24   GLUCOSE  98   BUN  17   CREATININE  0.90   CALCIUM  8.8     Recent Labs      06/28/18 2019   ALTSGPT  13   ASTSGOT  17   ALKPHOSPHAT  82   TBILIRUBIN  0.5   GLUCOSE  98     Recent Labs      06/28/18 2019   APTT  27.1   INR  1.08             No results found for: TROPONINI  Urinalysis:  No results found for: SPECGRAVITY, GLUCOSEUR, KETONES, NITRITE, WBCURINE, RBCURINE, BACTERIA, EPITHELCELL     Imaging  DX-KNEE 3 VIEWS LEFT   Final Result         1.  No acute traumatic bony injury.           Assessment/Plan     I anticipate this patient is appropriate for observation status at this time.    Abscess of left knee- (present on admission)   Assessment & Plan    Patient has been started on IV Unasyn and IV vancomycin   Orthopedics consultation for I&D  Follow blood and wound cultures  Wound care  Pain control with Norco              Cellulitis of left upper extremity- (present on admission)   Assessment & Plan    Cellulitis of left forearm with multiple maculopapular lesions  Continue antibiotics and assess response        Hypokalemia- (present on admission)   Assessment & Plan    Replaced with K Dur 40  Monitor BMP        Leukocytosis- (present on admission)   Assessment & Plan    Mild at 11.2 likely secondary to abscess  Continue to monitor vitals and CBC to watch for progression to full sepsis            VTE prophylaxis: SCD.

## 2018-06-29 NOTE — CARE PLAN
Problem: Pain Management  Goal: Pain level will decrease to patient's comfort goal  Post OP Po pain medications.

## 2018-06-29 NOTE — ED NOTES
Pt is unsure of the origin of sore on LLE, states he thinks he was bitten by a brown recluse spider. Pt states since the bite x3 days prior he has been itching and picking at his arms stating it feels like he has bugs on him. Pt denies illicit drug use, states he uses marijuana for chronic pain. Pt reports he used to use opiates for pain but now only uses marijuana. Pt admits to have a couple beers today, he does not appear intoxicated. Pt answers questions appropriately, is easy going to staff. Pt resting on gurney, family at BS

## 2018-06-29 NOTE — ED NOTES
Vanc has not been sent from central pharmacy, note has been sent. Med will be administered when it arrives.

## 2018-06-29 NOTE — ED NOTES
Pt denies taking any OTC or prescription medications  No herbal supplements  Allergies reviewed - NKDA  No ABX in last month  No preferred pharmacy

## 2018-06-29 NOTE — ASSESSMENT & PLAN NOTE
Patient has been started on IV Unasyn and IV vancomycin   6/29 s/p I&D  Follow blood and wound cultures  Wound care  Pain control with Gorin  6/29:  urine drug screen with THC, oxycodone.  ID consulted, HIV and Hep panel negative  ordered TdaP since reports brown recluse spider bite as nidus.  6/30:  Viewed photo of macerated rt knee wound prior to OR as well as photo showing wear patient was squeezing small pustule.  OR fluid culture + staph aureus ss pending.  BC negative.

## 2018-06-29 NOTE — CARE PLAN
Problem: Safety  Goal: Will remain free from falls    Intervention: Assess risk factors for falls  Patient up by self. Educated on falls.       Problem: Discharge Barriers/Planning  Goal: Patient's continuum of care needs will be met    Intervention: Assess potential discharge barriers on admission and throughout hospital stay  PO and IV pain medications.

## 2018-06-29 NOTE — OP REPORT
DATE OF SERVICE:  06/29/2018    PREOPERATIVE DIAGNOSIS:  Deep abscess, left leg.    POSTOPERATIVE DIAGNOSIS:  Deep abscess, left leg.    PROCEDURE:  Irrigation and debridement of left leg abscess.    SURGEON:  Jay Balderas MD    ASSISTANT:  Irvin Newell PA-C    ESTIMATED BLOOD LOSS:  Minimal.    INDICATIONS:  This is a 28-year-old male who presented with a lateral leg   abscess with gross purulent drainage.  Risks and benefits of irrigation and   debridement were discussed, which include but not limited to bleeding,   infection, neurovascular damage, pain, stiffness, and need for further   surgery.  He understands all these risks and wished to proceed.    DESCRIPTION OF PROCEDURE:  Patient was sedated with LMA anesthesia and   administered perioperative antibiotics.  Left lower extremity was prepped in   usual fashion.  His abscess was excised and debrided of skin, subcutaneous   tissue, and underlying muscle with a knife and rongeur in an excisional   fashion along its 3x4 cm area.  Wounds were then irrigated, closed with nylon   suture.  Sterile dressings were applied.  Patient tolerated the procedure   well.    PLAN:  The patient admitted for IV antibiotic therapy while awaiting   definitive diagnosis.       ____________________________________     JAY BALDERAS MD    GALA / NTS    DD:  06/29/2018 09:59:34  DT:  06/29/2018 10:23:38    D#:  [id number]  Job#:  [id number]

## 2018-06-29 NOTE — CONSULTS
6/29/2018    Reason for consultation:  Left knee abscess    Inpatient consultation on Gera Benedict at the request of Dr. Bah for left knee abscess.  The patient is a 28 y.o. male who presents with a left knee superficial abscess due to, reportedly, an insect bite.  The patient noted progressive pain, swelling, redness, and drainage, and difficulty moving the affected extremity due to pain.  They were evaluated in the ER, and Orthopedics was consulted. Patient denies numbness, paresthesias, fevers or other symptoms.    Past Medical History:   Diagnosis Date   • Chronic back pain        History reviewed. No pertinent surgical history.    Medications  No current facility-administered medications on file prior to encounter.      No current outpatient prescriptions on file prior to encounter.       Allergies  Patient has no known allergies.    ROS  Per HPI. All other systems were reviewed and found to be negative    Family History   Problem Relation Age of Onset   • Other Mother      unknown   • Other Father      unknown       Social History     Social History   • Marital status: Single     Spouse name: N/A   • Number of children: N/A   • Years of education: N/A     Social History Main Topics   • Smoking status: Current Some Day Smoker   • Smokeless tobacco: Never Used      Comment: avoid all tobacco products   • Alcohol use 2.0 oz/week     4 Cans of beer per week   • Drug use: Yes      Comment: marijuana   • Sexual activity: Yes     Partners: Female     Other Topics Concern   • Not on file     Social History Narrative   • No narrative on file       Physical Exam  Vitals  Blood pressure 148/79, pulse 73, temperature 36.4 °C (97.6 °F), resp. rate 16, weight 85.3 kg (188 lb 0.8 oz), SpO2 98 %.  General: Well Developed, Well Nourished, no acute distress  Psychiatric: Alert and oriented x3, appropriate responses to questions, pleasant mood and affect.  HEENT: Normocephalic, atraumatic  Eyes: Anicteric, PERRLA,  EOMI  Neck: Supple, nontender, no masses  Chest: Symmetric expansion of the chest wall, non-tender to palpation, no distress.  Heart: RRR, palpable peripheral pulses  Abdomen: Soft, NT, ND  Skin: Superficial appearing abscess left lateral leg/knee.  Surrounding erythema consistent with cellulitis.  Other lesions across his body in various stages  Extremities: Some discomfort with ROM of the left knee, but passive mid-arc motion isn't terribly painful  Neuro: Intact light touch sensation and motor function in the foot in all distributions  Vascular: 2+ Dp on left, Capillary refill <2 seconds    Radiographs:  DX-KNEE 3 VIEWS LEFT   Final Result         1.  No acute traumatic bony injury.          Laboratory Values  Recent Labs      06/28/18 2019   WBC  11.2*   RBC  5.35   HEMOGLOBIN  16.0   HEMATOCRIT  46.3   MCV  86.5   MCH  29.9   MCHC  34.6   RDW  37.6   PLATELETCT  266   MPV  10.6     Recent Labs      06/28/18 2019   SODIUM  143   POTASSIUM  3.3*   CHLORIDE  108   CO2  24   GLUCOSE  98   BUN  17     Recent Labs      06/28/18 2019   APTT  27.1   INR  1.08         Impression:    #1 Left knee abscess, surrounding cellulitis    Plan:    I recommended operative treatment of his abscess. Risks and benefits of surgery were discussed which include, but are not limited to bleeding, persistent infection, neurovascular damage, DVT, PE, MI, Stroke and death.  Benefits of surgery discussed included improved chance of infection clearance.  We also discussed therapeutic alternatives to surgery, including non-operative management, which I did not recommend.    They understand these risks and benefits and wish to proceed.      Please keep NPO pending surgery.  May remain fully weightbearing affected extremity pending surgery.    Please see operative note for detailed post-operative plan, including post-op weightbearing status.

## 2018-06-29 NOTE — PROGRESS NOTES
"Patient up to floor from ER.  A&Ox4.  VSS.  Pain to LLE 4/10, CMS intact.  2 RN skin check performed with Nemo LEMON, scabs and bruises to RUE and LUE from \"falling on a skateboard.\"  Left wrist wound erythematous and swollen, abscess to left knee.  Both wounds photographed, wound team consulted.  "

## 2018-06-29 NOTE — PROGRESS NOTES
Pharmacy Kinetics 28 y.o. male on vancomycin day # 1 2018    Currently on Vancomycin new start    Indication for Treatment: SSTI    Pertinent history per medical record: Admitted on 2018 for L knee swelling x 3 days. Patient reported redness, swelling to the L knee which eventually ruptured with purulent drainage. He also endorses fever and chills. Empiric antibiotics initiated for SSTI.     Other antibiotics: Unasyn 3 g IV q6h    Allergies: Patient has no known allergies.     List concerns for renal function: none    Pertinent cultures to date:    PBC x 2: in process   wound culture: ordered    Recent Labs      18   WBC  11.2*   NEUTSPOLYS  74.60*     Recent Labs      18   BUN  17   CREATININE  0.90   ALBUMIN  4.0     Blood pressure 130/64, pulse 87, temperature 36.6 °C (97.9 °F), resp. rate 15, weight 84.1 kg (185 lb 6.5 oz), SpO2 95 %. Temp (24hrs), Av.6 °C (97.9 °F), Min:36.6 °C (97.9 °F), Max:36.6 °C (97.9 °F)      A/P   1. Vancomycin dose change: initiate 15 mg/kg q8h  2. Next vancomycin level: prior to the 4th or 5th total dose (not yet orderd)  3. Goal trough: 12-16 mcg/mL  4. Comments: new start vancomycin for SSTI. Minimal risk factors for renal insult and accumulation. Will dose per protocol for now and plan for a level when closer to steady state. Wound cultures ordered but not yet collected. PBC in process. Will follow.    Madeline Amador, PharmD

## 2018-06-29 NOTE — ED PROVIDER NOTES
ED Provider Note    Scribed for Elie Bah D.O. by Ashkan Khan. 6/28/2018  8:28 PM    Primary care provider: SHIRLEY Conklin  Means of arrival: Walk-in  History obtained from: Patient  History limited by: None    CHIEF COMPLAINT  Chief Complaint   Patient presents with   • Wound Infection     left knee        HPI  Gera Benedict is a 28 y.o. male who presents to the Emergency Department complaining of a wound on his left knee that appeared three days ago. He attempted to drain he wound with a knife and it produced some purulent drainage and blood. He has been washing the wound with hydrogen peroxide and today some of the skin sloughed off the wound. The patient also indicated lesions on his bilateral wrists and hands that he has been picking at. One of the lesions was white and produced a large amount of purulent drainage when squeezed. The patient reports associated fever and diaphoresis. He denies vomiting or diarrhea. His tetanus shot is not up to date. He denies IV drug use, although, he admits to a history of heroin use.    REVIEW OF SYSTEMS  Pertinent positives include knee wound, lesions on hands and arms, fever, diaphoresis, and purulent drainage. Pertinent negatives include no vomiting or diarrhea.  All other systems reviewed and negative.  C    PAST MEDICAL HISTORY  None noted    SURGICAL HISTORY  No recent surgeries.     SOCIAL HISTORY  Social History   Substance Use Topics   • Smoking status: Current Some Day Smoker   • Smokeless tobacco: Never Used      Comment: avoid all tobacco products   • Alcohol use 2.0 oz/week     4 Cans of beer per week      History   Drug Use     Comment: marijuana       FAMILY HISTORY  Family History   Problem Relation Age of Onset   • Other Mother      unknown   • Other Father      unknown       CURRENT MEDICATIONS  No current facility-administered medications on file prior to encounter.      Current Outpatient Prescriptions on File Prior to  Encounter   Medication Sig Dispense Refill   • doxycycline (VIBRAMYCIN) 100 MG Tab Take 1 Tab by mouth 2 times a day. 20 Tab 0   • hydrocodone/acetaminophen (NORCO)  MG Tab Take 1 Tab by mouth every 6 hours as needed. 20 Tab 0   • lidocaine (LIDODERM) 5 % Patch Apply 1 Patch to skin as directed every 24 hours. 30 Patch 2       ALLERGIES  No Known Allergies    PHYSICAL EXAM  VITAL SIGNS: /64   Pulse 95   Temp 36.6 °C (97.9 °F)   Resp 19   Wt 84.1 kg (185 lb 6.5 oz)   SpO2 98%   BMI 25.15 kg/m²     Nursing notes and vitals reviewed.  Constitutional: Well developed, Well nourished, No acute distress, Non-toxic appearance.   Eyes: PERRLA, EOMI, Conjunctiva normal, No discharge.   Cardiovascular: Normal heart rate, Normal rhythm, No murmurs, No rubs, No gallops.   Thorax & Lungs: No respiratory distress, No rales, No rhonchi, No wheezing, No chest tenderness.   Abdomen: Bowel sounds normal, Soft, No tenderness, No guarding, No rebound, No masses, No pulsatile masses.   Skin: Warm, multiple erythematous, maculopapular lesions in the left dorsum upper extremity on vein [place], a 4 cm fluid filled lesion on the lateral aspect of the left knee joint with surrounding erythema that circumferential of the knee  Musculoskeletal: Intact distal pulses, No edema, No cyanosis, No clubbing. Decreased range of motion the left knee secondary to pain with the abscess the lateral aspect of the knee joint, distal pulses are brisk bilaterally, skin findings as above   Neurologic: Alert & oriented x 3, Normal motor function, Normal sensory function, No focal deficits noted.  Psychiatric: Affect normal for clinical presentation.    DIAGNOSTIC STUDIES/PROCEDURES    LABS  Results for orders placed or performed during the hospital encounter of 06/28/18   CBC WITH DIFFERENTIAL   Result Value Ref Range    WBC 11.2 (H) 4.8 - 10.8 K/uL    RBC 5.35 4.70 - 6.10 M/uL    Hemoglobin 16.0 14.0 - 18.0 g/dL    Hematocrit 46.3 42.0 - 52.0  %    MCV 86.5 81.4 - 97.8 fL    MCH 29.9 27.0 - 33.0 pg    MCHC 34.6 33.7 - 35.3 g/dL    RDW 37.6 35.9 - 50.0 fL    Platelet Count 266 164 - 446 K/uL    MPV 10.6 9.0 - 12.9 fL    Neutrophils-Polys 74.60 (H) 44.00 - 72.00 %    Lymphocytes 18.20 (L) 22.00 - 41.00 %    Monocytes 5.70 0.00 - 13.40 %    Eosinophils 0.90 0.00 - 6.90 %    Basophils 0.20 0.00 - 1.80 %    Immature Granulocytes 0.40 0.00 - 0.90 %    Nucleated RBC 0.00 /100 WBC    Neutrophils (Absolute) 8.37 (H) 1.82 - 7.42 K/uL    Lymphs (Absolute) 2.04 1.00 - 4.80 K/uL    Monos (Absolute) 0.64 0.00 - 0.85 K/uL    Eos (Absolute) 0.10 0.00 - 0.51 K/uL    Baso (Absolute) 0.02 0.00 - 0.12 K/uL    Immature Granulocytes (abs) 0.04 0.00 - 0.11 K/uL    NRBC (Absolute) 0.00 K/uL   COMP METABOLIC PANEL   Result Value Ref Range    Sodium 143 135 - 145 mmol/L    Potassium 3.3 (L) 3.6 - 5.5 mmol/L    Chloride 108 96 - 112 mmol/L    Co2 24 20 - 33 mmol/L    Anion Gap 11.0 0.0 - 11.9    Glucose 98 65 - 99 mg/dL    Bun 17 8 - 22 mg/dL    Creatinine 0.90 0.50 - 1.40 mg/dL    Calcium 8.8 8.5 - 10.5 mg/dL    AST(SGOT) 17 12 - 45 U/L    ALT(SGPT) 13 2 - 50 U/L    Alkaline Phosphatase 82 30 - 99 U/L    Total Bilirubin 0.5 0.1 - 1.5 mg/dL    Albumin 4.0 3.2 - 4.9 g/dL    Total Protein 6.9 6.0 - 8.2 g/dL    Globulin 2.9 1.9 - 3.5 g/dL    A-G Ratio 1.4 g/dL   APTT   Result Value Ref Range    APTT 27.1 24.7 - 36.0 sec   PROTHROMBIN TIME   Result Value Ref Range    PT 13.7 12.0 - 14.6 sec    INR 1.08 0.87 - 1.13   ESTIMATED GFR   Result Value Ref Range    GFR If African American >60 >60 mL/min/1.73 m 2    GFR If Non African American >60 >60 mL/min/1.73 m 2   CRP QUANTITIVE (NON-CARDIAC)   Result Value Ref Range    Stat C-Reactive Protein 0.78 (H) 0.00 - 0.75 mg/dL             RADIOLOGY  DX-KNEE 3 VIEWS LEFT   Final Result         1.  No acute traumatic bony injury.        The radiologist's interpretation of all radiological studies have been reviewed by me.    COURSE & MEDICAL  DECISION MAKING  Pertinent Labs & Imaging studies reviewed. (See chart for details)    8:28 PM - Patient seen and examined at bedside. Patient will be treated with ampicillin/sulbactam 3 g in  ml IVPB, morphine 8 injection mg, lidocaine-epinephrine 1% 1:103401 injection 20 ml, vancomycin 2100 mg in  ml IVPB. Ordered DX knee 3 views left, blood cultures x2, CBC with differential, CMP, APTT, PTT, and urine drug screen to evaluate his symptoms.     This is a 20-year-old male with abscess to the lateral aspect left knee. The patient had an IV established, blood cultures have been drawn. The patient may have expansion of the abscess into the knee joint itself. For this reason, I discussed the patient with Dr. Tipton who asked that I admit the patient to the hospital several be evaluating the patient tomorrow for possible surgical intervention. He has received Unasyn and vancomycin IV. The patient has no evidence of necrotizing fasciitis currently.    I discussed the patient Dr. Tipton for admission to the hospital as well as Dr. Mcgowan    FINAL IMPRESSION  1. Abscess    2. Cellulitis of left lower extremity          Ashkan CENTENO (Ashok), am scribing for, and in the presence of, Elie Bah D.O    Electronically signed by: Ashkan Khan (Ashok), 6/28/2018    IElie D.O. personally performed the services described in this documentation, as scribed by Ashkan Khan in my presence, and it is both accurate and complete.    The note accurately reflects work and decisions made by me.  Elie Bah  6/28/2018  10:33 PM

## 2018-06-29 NOTE — ASSESSMENT & PLAN NOTE
Cellulitis of left forearm with multiple maculopapular lesions  Continue antibiotics and assess response  6/29:  No evidence of infection LUE on eam.

## 2018-06-30 PROBLEM — F41.1 ANXIETY, GENERALIZED: Status: ACTIVE | Noted: 2018-06-30

## 2018-06-30 LAB
ANION GAP SERPL CALC-SCNC: 7 MMOL/L (ref 0–11.9)
BASOPHILS # BLD AUTO: 0.3 % (ref 0–1.8)
BASOPHILS # BLD: 0.03 K/UL (ref 0–0.12)
BUN SERPL-MCNC: 14 MG/DL (ref 8–22)
CALCIUM SERPL-MCNC: 8.3 MG/DL (ref 8.5–10.5)
CHLORIDE SERPL-SCNC: 108 MMOL/L (ref 96–112)
CO2 SERPL-SCNC: 27 MMOL/L (ref 20–33)
CREAT SERPL-MCNC: 0.74 MG/DL (ref 0.5–1.4)
EOSINOPHIL # BLD AUTO: 0.05 K/UL (ref 0–0.51)
EOSINOPHIL NFR BLD: 0.5 % (ref 0–6.9)
ERYTHROCYTE [DISTWIDTH] IN BLOOD BY AUTOMATED COUNT: 37.3 FL (ref 35.9–50)
GLUCOSE SERPL-MCNC: 113 MG/DL (ref 65–99)
HCT VFR BLD AUTO: 43.4 % (ref 42–52)
HCV AB SER QL: NEGATIVE
HGB BLD-MCNC: 15.3 G/DL (ref 14–18)
HIV 1+2 AB+HIV1 P24 AG SERPL QL IA: NON REACTIVE
IMM GRANULOCYTES # BLD AUTO: 0.05 K/UL (ref 0–0.11)
IMM GRANULOCYTES NFR BLD AUTO: 0.5 % (ref 0–0.9)
LYMPHOCYTES # BLD AUTO: 1.39 K/UL (ref 1–4.8)
LYMPHOCYTES NFR BLD: 12.9 % (ref 22–41)
MCH RBC QN AUTO: 30.7 PG (ref 27–33)
MCHC RBC AUTO-ENTMCNC: 35.3 G/DL (ref 33.7–35.3)
MCV RBC AUTO: 87 FL (ref 81.4–97.8)
MONOCYTES # BLD AUTO: 0.74 K/UL (ref 0–0.85)
MONOCYTES NFR BLD AUTO: 6.8 % (ref 0–13.4)
NEUTROPHILS # BLD AUTO: 8.55 K/UL (ref 1.82–7.42)
NEUTROPHILS NFR BLD: 79 % (ref 44–72)
NRBC # BLD AUTO: 0 K/UL
NRBC BLD-RTO: 0 /100 WBC
PLATELET # BLD AUTO: 224 K/UL (ref 164–446)
PMV BLD AUTO: 10.7 FL (ref 9–12.9)
POTASSIUM SERPL-SCNC: 3.9 MMOL/L (ref 3.6–5.5)
RBC # BLD AUTO: 4.99 M/UL (ref 4.7–6.1)
SODIUM SERPL-SCNC: 142 MMOL/L (ref 135–145)
VANCOMYCIN TROUGH SERPL-MCNC: 13.4 UG/ML (ref 10–20)
WBC # BLD AUTO: 10.8 K/UL (ref 4.8–10.8)

## 2018-06-30 PROCEDURE — 99225 PR SUBSEQUENT OBSERVATION CARE,LEVEL II: CPT | Performed by: HOSPITALIST

## 2018-06-30 PROCEDURE — 700105 HCHG RX REV CODE 258: Performed by: INTERNAL MEDICINE

## 2018-06-30 PROCEDURE — 700102 HCHG RX REV CODE 250 W/ 637 OVERRIDE(OP): Performed by: ORTHOPAEDIC SURGERY

## 2018-06-30 PROCEDURE — A9270 NON-COVERED ITEM OR SERVICE: HCPCS | Performed by: ORTHOPAEDIC SURGERY

## 2018-06-30 PROCEDURE — 96366 THER/PROPH/DIAG IV INF ADDON: CPT

## 2018-06-30 PROCEDURE — 700111 HCHG RX REV CODE 636 W/ 250 OVERRIDE (IP): Performed by: INTERNAL MEDICINE

## 2018-06-30 PROCEDURE — 85025 COMPLETE CBC W/AUTO DIFF WBC: CPT

## 2018-06-30 PROCEDURE — 700102 HCHG RX REV CODE 250 W/ 637 OVERRIDE(OP): Performed by: PHYSICIAN ASSISTANT

## 2018-06-30 PROCEDURE — 36415 COLL VENOUS BLD VENIPUNCTURE: CPT

## 2018-06-30 PROCEDURE — 80048 BASIC METABOLIC PNL TOTAL CA: CPT

## 2018-06-30 PROCEDURE — 700102 HCHG RX REV CODE 250 W/ 637 OVERRIDE(OP): Performed by: HOSPITALIST

## 2018-06-30 PROCEDURE — G0378 HOSPITAL OBSERVATION PER HR: HCPCS

## 2018-06-30 PROCEDURE — 700111 HCHG RX REV CODE 636 W/ 250 OVERRIDE (IP): Performed by: ORTHOPAEDIC SURGERY

## 2018-06-30 PROCEDURE — A9270 NON-COVERED ITEM OR SERVICE: HCPCS | Performed by: PHYSICIAN ASSISTANT

## 2018-06-30 PROCEDURE — 99214 OFFICE O/P EST MOD 30 MIN: CPT | Performed by: INTERNAL MEDICINE

## 2018-06-30 PROCEDURE — 80202 ASSAY OF VANCOMYCIN: CPT

## 2018-06-30 PROCEDURE — A9270 NON-COVERED ITEM OR SERVICE: HCPCS | Performed by: HOSPITALIST

## 2018-06-30 PROCEDURE — 96376 TX/PRO/DX INJ SAME DRUG ADON: CPT

## 2018-06-30 PROCEDURE — 700112 HCHG RX REV CODE 229: Performed by: ORTHOPAEDIC SURGERY

## 2018-06-30 RX ORDER — LORAZEPAM 1 MG/1
1 TABLET ORAL EVERY 6 HOURS PRN
Status: DISCONTINUED | OUTPATIENT
Start: 2018-06-30 | End: 2018-07-01

## 2018-06-30 RX ORDER — PAROXETINE HYDROCHLORIDE 20 MG/1
5 TABLET, FILM COATED ORAL DAILY
Status: DISCONTINUED | OUTPATIENT
Start: 2018-06-30 | End: 2018-07-01

## 2018-06-30 RX ADMIN — ACETAMINOPHEN 650 MG: 325 TABLET, FILM COATED ORAL at 05:32

## 2018-06-30 RX ADMIN — AMPICILLIN SODIUM AND SULBACTAM SODIUM 3 G: 2; 1 INJECTION, POWDER, FOR SOLUTION INTRAMUSCULAR; INTRAVENOUS at 02:42

## 2018-06-30 RX ADMIN — KETOROLAC TROMETHAMINE 30 MG: 30 INJECTION, SOLUTION INTRAMUSCULAR at 12:30

## 2018-06-30 RX ADMIN — AMPICILLIN SODIUM AND SULBACTAM SODIUM 3 G: 2; 1 INJECTION, POWDER, FOR SOLUTION INTRAMUSCULAR; INTRAVENOUS at 14:34

## 2018-06-30 RX ADMIN — AMPICILLIN SODIUM AND SULBACTAM SODIUM 3 G: 2; 1 INJECTION, POWDER, FOR SOLUTION INTRAMUSCULAR; INTRAVENOUS at 20:52

## 2018-06-30 RX ADMIN — VANCOMYCIN HYDROCHLORIDE 1300 MG: 100 INJECTION, POWDER, LYOPHILIZED, FOR SOLUTION INTRAVENOUS at 15:17

## 2018-06-30 RX ADMIN — OXYCODONE HYDROCHLORIDE 10 MG: 10 TABLET ORAL at 12:30

## 2018-06-30 RX ADMIN — ACETAMINOPHEN 650 MG: 325 TABLET, FILM COATED ORAL at 12:30

## 2018-06-30 RX ADMIN — PAROXETINE HYDROCHLORIDE 5 MG: 20 TABLET, FILM COATED ORAL at 17:41

## 2018-06-30 RX ADMIN — SODIUM CHLORIDE: 9 INJECTION, SOLUTION INTRAVENOUS at 02:42

## 2018-06-30 RX ADMIN — DOCUSATE SODIUM 100 MG: 100 CAPSULE ORAL at 09:20

## 2018-06-30 RX ADMIN — LORAZEPAM 1 MG: 1 TABLET ORAL at 13:46

## 2018-06-30 RX ADMIN — KETOROLAC TROMETHAMINE 30 MG: 30 INJECTION, SOLUTION INTRAMUSCULAR at 17:42

## 2018-06-30 RX ADMIN — KETOROLAC TROMETHAMINE 30 MG: 30 INJECTION, SOLUTION INTRAMUSCULAR at 05:32

## 2018-06-30 RX ADMIN — OXYCODONE HYDROCHLORIDE 10 MG: 10 TABLET ORAL at 05:32

## 2018-06-30 RX ADMIN — OXYCODONE HYDROCHLORIDE 10 MG: 10 TABLET ORAL at 20:53

## 2018-06-30 RX ADMIN — OXYCODONE HYDROCHLORIDE 10 MG: 10 TABLET ORAL at 15:30

## 2018-06-30 RX ADMIN — OXYCODONE HYDROCHLORIDE 10 MG: 10 TABLET ORAL at 09:20

## 2018-06-30 RX ADMIN — AMPICILLIN SODIUM AND SULBACTAM SODIUM 3 G: 2; 1 INJECTION, POWDER, FOR SOLUTION INTRAMUSCULAR; INTRAVENOUS at 09:21

## 2018-06-30 RX ADMIN — ACETAMINOPHEN 650 MG: 325 TABLET, FILM COATED ORAL at 17:41

## 2018-06-30 RX ADMIN — VANCOMYCIN HYDROCHLORIDE 1300 MG: 100 INJECTION, POWDER, LYOPHILIZED, FOR SOLUTION INTRAVENOUS at 22:09

## 2018-06-30 RX ADMIN — OXYCODONE HYDROCHLORIDE 10 MG: 10 TABLET ORAL at 02:38

## 2018-06-30 RX ADMIN — VANCOMYCIN HYDROCHLORIDE 1300 MG: 100 INJECTION, POWDER, LYOPHILIZED, FOR SOLUTION INTRAVENOUS at 05:38

## 2018-06-30 ASSESSMENT — PAIN SCALES - GENERAL
PAINLEVEL_OUTOF10: 7
PAINLEVEL_OUTOF10: 3
PAINLEVEL_OUTOF10: 5
PAINLEVEL_OUTOF10: 8
PAINLEVEL_OUTOF10: 5

## 2018-06-30 ASSESSMENT — PATIENT HEALTH QUESTIONNAIRE - PHQ9
1. LITTLE INTEREST OR PLEASURE IN DOING THINGS: NOT AT ALL
2. FEELING DOWN, DEPRESSED, IRRITABLE, OR HOPELESS: NOT AT ALL
SUM OF ALL RESPONSES TO PHQ9 QUESTIONS 1 AND 2: 0

## 2018-06-30 ASSESSMENT — ENCOUNTER SYMPTOMS
CHILLS: 0
FOCAL WEAKNESS: 0
DIARRHEA: 0
EYE PAIN: 0
EYE DISCHARGE: 0
NERVOUS/ANXIOUS: 1
SENSORY CHANGE: 0
SPUTUM PRODUCTION: 0
NAUSEA: 0
DEPRESSION: 0
COUGH: 0
WHEEZING: 0
DIAPHORESIS: 0
MYALGIAS: 0
ABDOMINAL PAIN: 0
HEADACHES: 0
NECK PAIN: 0
BACK PAIN: 0
VOMITING: 0
CONSTIPATION: 0
SORE THROAT: 0
SHORTNESS OF BREATH: 0
DIZZINESS: 0
CLAUDICATION: 0
HEMOPTYSIS: 0
FEVER: 0
LOSS OF CONSCIOUSNESS: 0
SPEECH CHANGE: 0
WEAKNESS: 0
BRUISES/BLEEDS EASILY: 0
PALPITATIONS: 0

## 2018-06-30 ASSESSMENT — LIFESTYLE VARIABLES: SUBSTANCE_ABUSE: 0

## 2018-06-30 NOTE — PROGRESS NOTES
Infectious Disease Progress Note    Author: Lori Whalen M.D. Date & Time of service: 2018  3:56 PM    Chief Complaint:  Knee and arm abscess.      Interval History:  28-year-old white male wadmitted for multiple skin abscesses affecting his left upper extremity and his left knee   AF WBC 10.8 c/o anxiety-pain decreased  Labs Reviewed, Medications Reviewed, Radiology Reviewed and Wound Reviewed.    Review of Systems:  Review of Systems   Constitutional: Negative for chills and fever.   Psychiatric/Behavioral: The patient is nervous/anxious.    All other systems reviewed and are negative.      Hemodynamics:  Temp (24hrs), Av.7 °C (98.1 °F), Min:36.4 °C (97.6 °F), Max:36.9 °C (98.4 °F)  Temperature: 36.7 °C (98.1 °F)  Pulse  Av.3  Min: 58  Max: 95   Blood Pressure: 133/70       Physical Exam:  Physical Exam   Constitutional: He is oriented to person, place, and time. He appears well-developed.   HENT:   Head: Normocephalic and atraumatic.   Eyes: EOM are normal. Pupils are equal, round, and reactive to light.   Neck: Neck supple.   Cardiovascular: Normal rate.    No murmur heard.  Pulmonary/Chest: Effort normal. No respiratory distress.   Abdominal: Soft. He exhibits no distension.   Musculoskeletal: He exhibits edema and tenderness.   Neurological: He is alert and oriented to person, place, and time.   Skin: He is not diaphoretic.   Decreased erythema and swelling left wrist lesion  LLE in surgical dressing   Nursing note and vitals reviewed.      Meds:    Current Facility-Administered Medications:   •  LORazepam  •  morphine injection  •  dexamethasone  •  diphenhydrAMINE  •  haloperidol lactate  •  scopolamine  •  docusate sodium  •  senna-docusate  •  senna-docusate  •  polyethylene glycol/lytes  •  magnesium hydroxide  •  bisacodyl  •  fleet  •  acetaminophen  •  ketorolac  •  oxyCODONE immediate-release  •  oxyCODONE immediate release  •  NS  •  Respiratory Care per Protocol  •  NS  •   ampicillin-sulbactam (UNASYN) IV  •  MD ALERT... vancomycin  •  ondansetron  •  ondansetron  •  promethazine  •  promethazine  •  prochlorperazine  •  HYDROcodone-acetaminophen  •  vancomycin    Labs:  Recent Labs      06/28/18 2019 06/30/18   0539   WBC  11.2*  10.8   RBC  5.35  4.99   HEMOGLOBIN  16.0  15.3   HEMATOCRIT  46.3  43.4   MCV  86.5  87.0   MCH  29.9  30.7   RDW  37.6  37.3   PLATELETCT  266  224   MPV  10.6  10.7   NEUTSPOLYS  74.60*  79.00*   LYMPHOCYTES  18.20*  12.90*   MONOCYTES  5.70  6.80   EOSINOPHILS  0.90  0.50   BASOPHILS  0.20  0.30     Recent Labs      06/28/18 2019 06/30/18   0539   SODIUM  143  142   POTASSIUM  3.3*  3.9   CHLORIDE  108  108   CO2  24  27   GLUCOSE  98  113*   BUN  17  14     Recent Labs      06/28/18 2019 06/30/18   0539   ALBUMIN  4.0   --    TBILIRUBIN  0.5   --    ALKPHOSPHAT  82   --    TOTPROTEIN  6.9   --    ALTSGPT  13   --    ASTSGOT  17   --    CREATININE  0.90  0.74       Imaging:  Dx-knee 3 Views Left    Result Date: 6/28/2018 6/28/2018 9:16 PM HISTORY/REASON FOR EXAM: Pain/Deformity Following Trauma TECHNIQUE/EXAM DESCRIPTION:  AP, lateral, and oblique views of the LEFT knee. COMPARISON:  None. FINDINGS: The bony structures and articulations appear within normal limits without visualized fracture, subluxation, or dislocation.     1.  No acute traumatic bony injury.      Micro:  Results     Procedure Component Value Units Date/Time    ANAEROBIC CULTURE [[id number]] Collected:  06/29/18 0945    Order Status:  Completed Specimen:  Tissue Updated:  06/30/18 1340     Significant Indicator NEG     Source TISS     Site Left Leg Abscess     Anaerobic Culture, Culture Res Culture in progress.    CULTURE WOUND W/ GRAM STAIN [[id number]]  (Abnormal) Collected:  06/29/18 0945    Order Status:  Completed Specimen:  Tissue Updated:  06/30/18 1340     Significant Indicator POS (POS)     Source TISS     Site Left Leg Abscess     Culture Result Wound -- (A)     Gram  "Stain Result Moderate WBCs.  Moderate Gram positive cocci.   (A)     Culture Result Wound Staphylococcus aureus  Heavy growth   (A)    CULTURE WOUND W/ GRAM STAIN [[id number]]  (Abnormal) Collected:  06/28/18 2334    Order Status:  Completed Specimen:  Wound from Left Leg Updated:  06/30/18 1108     Significant Indicator POS (POS)     Source WND     Site LEFT LEG     Culture Result Wound -- (A)     Gram Stain Result Rare WBCs.  Few Gram positive cocci.       Culture Result Wound Staphylococcus aureus  Heavy growth   (A)    Narrative:       Left knee    GRAM STAIN [[id number]] Collected:  06/29/18 0945    Order Status:  Completed Specimen:  Tissue Updated:  06/29/18 2310     Significant Indicator .     Source TISS     Site Left Leg Abscess     Gram Stain Result Moderate WBCs.  Moderate Gram positive cocci.      BLOOD CULTURE [[id number]] Collected:  06/28/18 2049    Order Status:  Completed Specimen:  Blood from Peripheral Updated:  06/29/18 0753     Significant Indicator NEG     Source BLD     Site PERIPHERAL     Blood Culture No Growth    Note: Blood cultures are incubated for 5 days and  are monitored continuously.Positive blood cultures  are called to the RN and reported as soon as  they are identified.      Narrative:       1 of 2 for Blood Culture x 2 sites order. Per Hospital  Policy: Only change Specimen Src: to \"Line\" if specified by  physician order.    BLOOD CULTURE [[id number]] Collected:  06/28/18 2057    Order Status:  Completed Specimen:  Blood from Peripheral Updated:  06/29/18 0753     Significant Indicator NEG     Source BLD     Site PERIPHERAL     Blood Culture No Growth    Note: Blood cultures are incubated for 5 days and  are monitored continuously.Positive blood cultures  are called to the RN and reported as soon as  they are identified.      Narrative:       2 of 2 blood culture x2  Sites order. Per Hospital Policy:  Only change Specimen Src: to \"Line\" if specified by physician  order.    GRAM " STAIN [[id number]] Collected:  06/28/18 2334    Order Status:  Completed Specimen:  Wound Updated:  06/29/18 0784     Significant Indicator .     Source WND     Site LEFT LEG     Gram Stain Result Rare WBCs.  Few Gram positive cocci.      Narrative:       Left knee          Assessment:  Active Hospital Problems    Diagnosis   • Abscess of left knee [L02.416]   • Cellulitis of left upper extremity [L03.114]   • Leukocytosis [D72.829]   • Hypokalemia [E87.6]       Plan:  Knee and arm abscesses/cellulitis.  Afebrile  Resolved  leukocytosis.    Wound cultures +SA, sensi pending   Blood cultures x2 are negative   status post irrigation and debridement of the knee abscess   If blood cultures remain negative and continued clinical improvement, should be transitioned to oral therapy in the next 24-48 hours.   Continue vancomycin and Unasyn for now.     HIV, hep C neg    Anxiety  Per PCT    Discussed with internal medicine.   no

## 2018-06-30 NOTE — PROGRESS NOTES
Call received from RN patient having a panic attack and threatening to leave AMA  Ativan 1mg po Q6 prn added   Patient calm post medication

## 2018-06-30 NOTE — CARE PLAN
Problem: Safety  Goal: Will remain free from falls  Outcome: PROGRESSING AS EXPECTED    Intervention: Implement fall precautions  Bed in low position, wheels locked, call light within reach, hourly rounding in place.      Problem: Infection  Goal: Will remain free from infection  Outcome: PROGRESSING AS EXPECTED  Patient with known infection to left knee.  Intervention: Implement standard precautions and perform hand washing before and after patient contact  Standard precautions maintained and hand washing performed.

## 2018-06-30 NOTE — CARE PLAN
Problem: Pain Management  Goal: Pain level will decrease to patient's comfort goal  PO pain medications.

## 2018-06-30 NOTE — PROGRESS NOTES
Pharmacy Kinetics 28 y.o. male on vancomycin day # 3   2018    Currently on Vancomycin 1300 mg iv q8hr (06, 14, 22)    Indication for Treatment: SSTI - knee and arm abscesses    Pertinent history per medical record: Admitted on 2018 for L knee and LUE abscesses. Patient reports a spider bite on his knee ~3 days PTA and also admits to picking at his skin. Patient initiated on empiric unasyn/vancomycin on admission and underwent I&D of the knee abscess on . ID and wound care consulting.    Other antibiotics: ampicillin/sulbactam 3 g IV q6hrs    Allergies: Patient has no known allergies.     No major concerns for renal function identified.    Pertinent cultures to date:   : L leg abscess cx - Heavy growth Staphylococcus aureus, sensitivities pending  : L leg wound cx - Heavy growth Staphylococcus aureus, sensitivities pending  : peripheral blood cx X2 - NGTD    Recent Labs      18   0539   WBC  11.2*  10.8   NEUTSPOLYS  74.60*  79.00*     Recent Labs      18   0539   BUN  17  14   CREATININE  0.90  0.74   ALBUMIN  4.0   --      Recent Labs      18   0539   VANCOTROUGH  13.4     Intake/Output Summary (Last 24 hours) at 18 1251  Last data filed at 18 0600   Gross per 24 hour   Intake             3100 ml   Output                0 ml   Net             3100 ml      Blood pressure 133/70, pulse 85, temperature 36.7 °C (98.1 °F), resp. rate 17, weight 85.3 kg (188 lb 0.8 oz), SpO2 98 %. Temp (24hrs), Av.7 °C (98 °F), Min:36.4 °C (97.6 °F), Max:36.9 °C (98.4 °F)      A/P   1. Vancomycin dose change: Continue current regimen  2. Next vancomycin level: ~3 days (not currently ordered)  3. Goal trough: 12-16 mcg/mL   4. Comments: Patient remains stable on current antibiotic regimen, leukocytosis resolved and patient afebrile overnight. Wound cultures growing staph aureus and pending final susceptibility reporting, blood cultures remain  negative. Renal indices stable and vanco trough obtained this AM within therapeutic range. Will continue current vanco regimen and plan repeat trough in ~3 days to evaluate and adjust as necessary. Will continue to follow cultures and recommend de-escalation of antibiotics if continued vanco therapy is no longer indicated.    Pharmacy will continue to follow.     Cynthia Alvarenga, WillamD

## 2018-06-30 NOTE — PROGRESS NOTES
[facility] Hospitalist Progress Note    Date of Service: 6/30/2018    Chief Complaint  28 y.o. male admitted 6/28/2018 who presented 6/28/2018 with left knee swelling for the past 3 days.  Patient reported increasing swelling, redness of his left lateral knee that started after a spider bite which eventually ruptured with purulent drainage.  The patient also reports multiple smaller abscesses on his arms which she has been picking it with minimal drainage and redness.  The patient denies using IV drugs.  He reports fevers and chills.      Interval Problem Update  6/29:  OR gram stain with gram +cocci s/p I&D.  Denies any drug use, IV or meth.  Post op with minimal pain.  6/30:  OR fluid culture +staph aureus ss pending.  bcs negative x2, patient had been picking at his rt knee wound prior to infection.  +extreme anxiety today, po ativan helped.  He is concerned about missing a visitation for custody of his children.  Will re-assess tomorrow.  Start low dose paxil.    Consultants/Specialty  Dr. Ty Whalen    Disposition  No needs, ambulating well.      Review of Systems   Constitutional: Negative for chills, diaphoresis, fever and malaise/fatigue.   HENT: Negative for congestion and sore throat.    Eyes: Negative for pain and discharge.   Respiratory: Negative for cough, hemoptysis, sputum production, shortness of breath and wheezing.    Cardiovascular: Positive for leg swelling. Negative for chest pain, palpitations and claudication.   Gastrointestinal: Negative for abdominal pain, constipation, diarrhea, melena, nausea and vomiting.   Genitourinary: Negative for dysuria, frequency and urgency.   Musculoskeletal: Positive for joint pain (left leg). Negative for back pain, myalgias and neck pain.   Skin: Negative for itching and rash.   Neurological: Negative for dizziness, sensory change, speech change, focal weakness, loss of consciousness, weakness and headaches.   Endo/Heme/Allergies: Does not bruise/bleed  easily.   Psychiatric/Behavioral: Negative for depression, substance abuse and suicidal ideas.      Physical Exam  Laboratory/Imaging   Hemodynamics  Temp (24hrs), Av.7 °C (98.1 °F), Min:36.4 °C (97.5 °F), Max:36.9 °C (98.4 °F)   Temperature: 36.4 °C (97.5 °F)  Pulse  Av.9  Min: 58  Max: 97    Blood Pressure: 135/73      Respiratory      Respiration: 20, Pulse Oximetry: 96 %     Work Of Breathing / Effort: Mild       Fluids    Intake/Output Summary (Last 24 hours) at 18 1655  Last data filed at 18 0600   Gross per 24 hour   Intake             3100 ml   Output                0 ml   Net             3100 ml       Nutrition  Orders Placed This Encounter   Procedures   • Diet Order Regular     Standing Status:   Standing     Number of Occurrences:   1     Order Specific Question:   Diet:     Answer:   Regular [1]     Physical Exam   Constitutional: He is oriented to person, place, and time. He appears well-developed and well-nourished. No distress.   HENT:   Head: Normocephalic and atraumatic.   Mouth/Throat: Oropharynx is clear and moist. No oropharyngeal exudate.   Eyes: Conjunctivae and EOM are normal. Pupils are equal, round, and reactive to light. Right eye exhibits no discharge. Left eye exhibits no discharge. No scleral icterus.   Neck: Normal range of motion. Neck supple. No JVD present. No tracheal deviation present. No thyromegaly present.   Cardiovascular: Normal rate, regular rhythm and normal heart sounds.  Exam reveals no gallop and no friction rub.    No murmur heard.  Pulmonary/Chest: Effort normal and breath sounds normal. No respiratory distress. He has no wheezes. He has no rales. He exhibits no tenderness.   Abdominal: Soft. Bowel sounds are normal. He exhibits no distension and no mass. There is no tenderness. There is no rebound and no guarding.   Musculoskeletal: Normal range of motion. He exhibits edema (bandaged post op.). He exhibits no tenderness.   Lymphadenopathy:     He  has no cervical adenopathy.   Neurological: He is alert and oriented to person, place, and time. No cranial nerve deficit. He exhibits normal muscle tone.   Skin: Skin is warm and dry. No rash noted. He is not diaphoretic. No erythema.   Left forearm dorsum with scarring from picking at prior abscesses.   Psychiatric: He has a normal mood and affect. His behavior is normal. Judgment and thought content normal.   Nursing note and vitals reviewed.      Recent Labs      06/28/18 2019 06/30/18   0539   WBC  11.2*  10.8   RBC  5.35  4.99   HEMOGLOBIN  16.0  15.3   HEMATOCRIT  46.3  43.4   MCV  86.5  87.0   MCH  29.9  30.7   MCHC  34.6  35.3   RDW  37.6  37.3   PLATELETCT  266  224   MPV  10.6  10.7     Recent Labs      06/28/18 2019 06/30/18   0539   SODIUM  143  142   POTASSIUM  3.3*  3.9   CHLORIDE  108  108   CO2  24  27   GLUCOSE  98  113*   BUN  17  14   CREATININE  0.90  0.74   CALCIUM  8.8  8.3*     Recent Labs      06/28/18 2019   APTT  27.1   INR  1.08                  Assessment/Plan     Abscess of left knee- (present on admission)   Assessment & Plan    Patient has been started on IV Unasyn and IV vancomycin   6/29 s/p I&D  Follow blood and wound cultures  Wound care  Pain control with Sharon  6/29:  urine drug screen with THC, oxycodone.  ID consulted, HIV and Hep panel negative  ordered TdaP since reports brown recluse spider bite as nidus.  6/30:  Viewed photo of macerated rt knee wound prior to OR as well as photo showing wear patient was squeezing small pustule.  OR fluid culture + staph aureus ss pending.  BC negative.              Cellulitis of left upper extremity- (present on admission)   Assessment & Plan    Cellulitis of left forearm with multiple maculopapular lesions  Continue antibiotics and assess response  6/29:  No evidence of infection LUE on eam.        Anxiety, generalized- (present on admission)   Assessment & Plan    Normally self treats with THC.  6/30  Ortho Started ativan po prn  anxiety.  May try low dose paxil to help control anxiety.        Hypokalemia- (present on admission)   Assessment & Plan    Replaced with K Dur 40  Monitor BMP        Leukocytosis- (present on admission)   Assessment & Plan    Mild at 11.2 likely secondary to abscess  Continue to monitor vitals and CBC to watch for progression to full sepsis          Quality-Core Measures

## 2018-06-30 NOTE — CARE PLAN
Problem: Safety  Goal: Will remain free from falls  Patient educated on falls and voiced understanding

## 2018-06-30 NOTE — PROGRESS NOTES
DATE OF SERVICE:  06/30/2018    DATE OF SERVICE:  06/30/2018    TIME:  07:28    SUBJECTIVE:  The patient had I and D of the left knee yesterday.  His pain is   under control.    OBJECTIVE:  VITAL SIGNS:  His blood pressure is 101/53, heart rate 63, respirations 17,   and temperature is 98.4.    Dressing is dry.    LABORATORY DATA:  His white blood cell count is normal.  The intraoperative   Gram-stain shows moderate gram-positive cocci.    ASSESSMENT:  Left knee abscess -- status post incision and drainage.    PLAN:  Antibiotics.  Dressing change tomorrow.       ____________________________________     MD SHERIN BRO / NATALY    DD:  06/30/2018 08:00:02  DT:  06/30/2018 08:40:19    D#:  [id number]  Job#:  [id number]

## 2018-06-30 NOTE — ASSESSMENT & PLAN NOTE
Normally self treats with THC.  6/30  Ortho Started ativan po prn anxiety.  May try low dose paxil to help control anxiety.

## 2018-06-30 NOTE — PROGRESS NOTES
[facility] Hospitalist Progress Note    Date of Service: 2018    Chief Complaint  28 y.o. male admitted 2018 who presented 2018 with left knee swelling for the past 3 days.  Patient reported increasing swelling, redness of his left lateral knee that started after a spider bite which eventually ruptured with purulent drainage.  The patient also reports multiple smaller abscesses on his arms which she has been picking it with minimal drainage and redness.  The patient denies using IV drugs.  He reports fevers and chills.      Interval Problem Update  :  OR gram stain with gram +cocci s/p I&D.  Denies any drug use, IV or meth.  Post op with minimal pain.    Consultants/Specialty  Dr. Ty Whalen    Disposition  No needs.      Review of Systems   Constitutional: Negative for chills, diaphoresis, fever and malaise/fatigue.   HENT: Negative for congestion and sore throat.    Eyes: Negative for pain and discharge.   Respiratory: Negative for cough, hemoptysis, sputum production, shortness of breath and wheezing.    Cardiovascular: Positive for leg swelling. Negative for chest pain, palpitations and claudication.   Gastrointestinal: Negative for abdominal pain, constipation, diarrhea, melena, nausea and vomiting.   Genitourinary: Negative for dysuria, frequency and urgency.   Musculoskeletal: Positive for joint pain (left leg). Negative for back pain, myalgias and neck pain.   Skin: Negative for itching and rash.   Neurological: Negative for dizziness, sensory change, speech change, focal weakness, loss of consciousness, weakness and headaches.   Endo/Heme/Allergies: Does not bruise/bleed easily.   Psychiatric/Behavioral: Negative for depression, substance abuse and suicidal ideas.      Physical Exam  Laboratory/Imaging   Hemodynamics  Temp (24hrs), Av.6 °C (97.9 °F), Min:36.3 °C (97.3 °F), Max:36.9 °C (98.4 °F)   Temperature: 36.4 °C (97.6 °F)  Pulse  Av  Min: 58  Max: 95 Heart Rate  (Monitored): 76  Blood Pressure: 146/58, NIBP: 149/68      Respiratory      Respiration: 17, Pulse Oximetry: 94 %, O2 Daily Delivery Respiratory : Room Air with O2 Available     Work Of Breathing / Effort: Mild       Fluids    Intake/Output Summary (Last 24 hours) at 06/29/18 2035  Last data filed at 06/29/18 1107   Gross per 24 hour   Intake              910 ml   Output               10 ml   Net              900 ml       Nutrition  Orders Placed This Encounter   Procedures   • Diet Order Regular     Standing Status:   Standing     Number of Occurrences:   1     Order Specific Question:   Diet:     Answer:   Regular [1]     Physical Exam   Constitutional: He is oriented to person, place, and time. He appears well-developed and well-nourished. No distress.   HENT:   Head: Normocephalic and atraumatic.   Mouth/Throat: Oropharynx is clear and moist. No oropharyngeal exudate.   Eyes: Conjunctivae and EOM are normal. Pupils are equal, round, and reactive to light. Right eye exhibits no discharge. Left eye exhibits no discharge. No scleral icterus.   Neck: Normal range of motion. Neck supple. No JVD present. No tracheal deviation present. No thyromegaly present.   Cardiovascular: Normal rate, regular rhythm and normal heart sounds.  Exam reveals no gallop and no friction rub.    No murmur heard.  Pulmonary/Chest: Effort normal and breath sounds normal. No respiratory distress. He has no wheezes. He has no rales. He exhibits no tenderness.   Abdominal: Soft. Bowel sounds are normal. He exhibits no distension and no mass. There is no tenderness. There is no rebound and no guarding.   Musculoskeletal: Normal range of motion. He exhibits edema (bandaged post op.). He exhibits no tenderness.   Lymphadenopathy:     He has no cervical adenopathy.   Neurological: He is alert and oriented to person, place, and time. No cranial nerve deficit. He exhibits normal muscle tone.   Skin: Skin is warm and dry. No rash noted. He is not  diaphoretic. No erythema.   Left forearm dorsum with scarring from picking at prior abscesses.   Psychiatric: He has a normal mood and affect. His behavior is normal. Judgment and thought content normal.   Nursing note and vitals reviewed.      Recent Labs      06/28/18 2019   WBC  11.2*   RBC  5.35   HEMOGLOBIN  16.0   HEMATOCRIT  46.3   MCV  86.5   MCH  29.9   MCHC  34.6   RDW  37.6   PLATELETCT  266   MPV  10.6     Recent Labs      06/28/18 2019   SODIUM  143   POTASSIUM  3.3*   CHLORIDE  108   CO2  24   GLUCOSE  98   BUN  17   CREATININE  0.90   CALCIUM  8.8     Recent Labs      06/28/18 2019   APTT  27.1   INR  1.08                  Assessment/Plan     Abscess of left knee- (present on admission)   Assessment & Plan    Patient has been started on IV Unasyn and IV vancomycin   6/29 s/p I&D  Follow blood and wound cultures  Wound care  Pain control with Tyrone  6/29:  urine drug screen with THC, oxycodone.  ID consulted, ordered HIV and Hep panel.  ordered TdaP since reports brown recluse spider bite as nidus.              Cellulitis of left upper extremity- (present on admission)   Assessment & Plan    Cellulitis of left forearm with multiple maculopapular lesions  Continue antibiotics and assess response  6/29:  No evidence of infection LUE on eam.        Hypokalemia- (present on admission)   Assessment & Plan    Replaced with K Dur 40  Monitor BMP        Leukocytosis- (present on admission)   Assessment & Plan    Mild at 11.2 likely secondary to abscess  Continue to monitor vitals and CBC to watch for progression to full sepsis          Quality-Core Measures

## 2018-06-30 NOTE — CONSULTS
DATE OF SERVICE:  06/29/2018    INFECTIOUS DISEASES CONSULTATION    REASON FOR CONSULT:  Knee and arm abscess.    CONSULTING PHYSICIANS:  Jennifer Jamil MD, and Dr. Tipton.    HISTORY OF PRESENT ILLNESS:  This is a 28-year-old white male who was   initially admitted to the hospital on 06/28/2018 due to left knee and left   upper extremity swelling that started approximately 3 days prior to admission.    Patient states he thinks he was bitten by spiders on his left arm as well as   his left knee.  He does admit to picking at his skin.  He noticed that the   pain, swelling and erythema continued to increase and there was some purulent   drainage, so he came to the hospital for further evaluation and management.    He had associated symptoms of subjective fever and chills, but no   nausea, vomiting or diarrhea.  He denies IV drug use.  He is currently   receiving vancomycin and Unasyn and infectious diseases consulted for   antibiotic recommendations and management.  He is status post I and D this   morning of his left knee abscess.    REVIEW OF SYSTEMS:  All other systems are reviewed and are negative.    ALLERGIES:  He has no known drug allergies.    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    FAMILY HISTORY:  Denied.    SOCIAL HISTORY:  He drinks socially, denies illicit drug use.    PHYSICAL EXAMINATION:  GENERAL:  He is a well-nourished, but anxious-appearing male in no acute   distress, states he was told he could leave the hospital to go, see his kids.  VITAL SIGNS:  Temperature is 97.6, blood pressure 146/58, pulse of 83,   respiratory rate 18, oxygen saturation 94% on room air.  He weighs 83 kg.  HEENT:  Normocephalic, atraumatic.  Pupils are equal, round and reactive to   light.  Extraocular movements intact.  Oropharynx is clear.  NECK:  Supple.  CARDIOVASCULAR:  Regular rate and  rhythm.  CHEST:  Grossly clear to auscultation bilaterally, unlabored.  ABDOMEN:  Soft, nontender.  EXTREMITIES:  Show surgical dressing in place in his left upper extremity.    Pictures reviewed in media and showed lesion on his medial left knee with   purulent drainage, surrounding erythema, induration.  Left wrist has multiple   excoriations, erythema ____.  NEUROLOGIC:  He is awake, alert and oriented.  Speech is without dysarthria.    Cranial nerves are intact.    CURRENT LABORATORY DATA:  His white blood cell count 11.2, H and H of 16 and   46, platelets of 266.  Sed rate was 9.  Sodium 143, potassium 3.3, chloride   108, bicarb 24, glucose 98, BUN 17, creatinine 0.9, AST 17, ALT 13.  Drugs of   abuse are positive for benzodiazepines and cannabis.  C-reactive protein was   positive.  CT scan was not done.    ASSESSMENT AND PLAN:  A 28-year-old white male with no significant past   medical history other than degenerative joint disease of his back presented to   the hospital for multiple skin abscesses affecting his left upper extremity   and his left knee.  Patient is right handed.  He is currently afebrile.  He   does have leukocytosis.  Wound cultures are pending.  Gram stain is showing a   few Gram-positive cocci.  Findings are most concerning for self-inflicted   injury, possibly simply due to scratching excoriations.  Doubt spider bite as   his physical findings are not consistent.  Blood cultures x2 are negative so   far.  He is status post irrigation and debridement of the knee abscess and   wound has been packed.  Anticipate if blood cultures remain negative and   clinically improved patient should be transitioned to oral therapy in the next   24-48 hours. Continue vancomycin and Unasyn for now.   We would also check HIV, hep C to verify there are another   other comorbid conditions.  Discussed with internal medicine.    Thank you and we will follow with you and surgery.        ____________________________________     MD RUSS MOSS / NATALY    DD:  06/29/2018 17:10:37  DT:  06/29/2018 17:38:54    D#:  [id number]  Job#:  [id number]

## 2018-06-30 NOTE — PROGRESS NOTES
"Pharmacy Kinetics 28 y.o. male on vancomycin day # 2  6/29/2018    Currently Dose: Vancomycin 1300 mg iv q8hr (~15 mg/kg)  Received Load Dose: Yes    Indication for Treatment: SSTI  ID Service Following: No (orthopedics consulted)    Pertinent history per medical record: Admitted on 6/28/2018 for left knee edema for ~3 days prior to admission. Orthopedics consulted on admission for possible SSTI vs abscess. I&D 6/29/18.    Other antibiotics: ampicillin/sulbactam 3 gm iv q6h    Allergies: Patient has no known allergies.     List concerns for accumulation of vancomycin: minimal concerns identified    Pertinent cultures to date:   Results     Procedure Component Value Units Date/Time    CULTURE WOUND W/ GRAM STAIN [[id number]] Collected:  06/29/18 0945    Order Status:  Completed Specimen:  Other Updated:  06/29/18 1133    ANAEROBIC CULTURE [[id number]] Collected:  06/29/18 0945    Order Status:  Completed Specimen:  Other Updated:  06/29/18 1133    BLOOD CULTURE [[id number]] Collected:  06/28/18 2049    Order Status:  Completed Specimen:  Blood from Peripheral Updated:  06/29/18 0753     Significant Indicator NEG     Source BLD     Site PERIPHERAL     Blood Culture No Growth    Note: Blood cultures are incubated for 5 days and  are monitored continuously.Positive blood cultures  are called to the RN and reported as soon as  they are identified.      Narrative:       1 of 2 for Blood Culture x 2 sites order. Per Hospital  Policy: Only change Specimen Src: to \"Line\" if specified by  physician order.    BLOOD CULTURE [[id number]] Collected:  06/28/18 2057    Order Status:  Completed Specimen:  Blood from Peripheral Updated:  06/29/18 0753     Significant Indicator NEG     Source BLD     Site PERIPHERAL     Blood Culture No Growth    Note: Blood cultures are incubated for 5 days and  are monitored continuously.Positive blood cultures  are called to the RN and reported as soon as  they are identified.      Narrative:       2 " "of 2 blood culture x2  Sites order. Per Hospital Policy:  Only change Specimen Src: to \"Line\" if specified by physician  order.    GRAM STAIN [[id number]] Collected:  18    Order Status:  Completed Specimen:  Wound Updated:  18 0735     Significant Indicator .     Source WND     Site LEFT LEG     Gram Stain Result Rare WBCs.  Few Gram positive cocci.      Narrative:       Left knee    CULTURE WOUND W/ GRAM STAIN [[id number]] Collected:  18    Order Status:  Completed Specimen:  Blood from Left Leg Updated:  18    Narrative:       Left knee        Recent Labs      18   WBC  11.2*   NEUTSPOLYS  74.60*     Recent Labs      18   BUN  17   CREATININE  0.90   ALBUMIN  4.0     Intake/Output Summary (Last 24 hours) at 18 1700  Last data filed at 18 1107   Gross per 24 hour   Intake              910 ml   Output               10 ml   Net              900 ml      Blood pressure 146/58, pulse 83, temperature 36.4 °C (97.6 °F), resp. rate 17, weight 85.3 kg (188 lb 0.8 oz), SpO2 94 %. Temp (24hrs), Av.6 °C (97.9 °F), Min:36.3 °C (97.3 °F), Max:36.9 °C (98.4 °F)    CrCl cannot be calculated (Unknown ideal weight.).    A/P   1. Vancomycin dose change: not indicated   2. Next vancomycin level: 18 @0530  3. Goal trough: 12-16 mcg/mL  4. Comments: VS stable. Afebrile. WBC elevated. SCr unremarkable. Microbiology pending. Recent I&D noted in OR. Minimal factors for accumulation identified. Trough in place prior to AM dose 18 to ensure clearance. Pharmacy will follow and continue to adjust as appropriate.    Ashkan Goodman, PharmD  "

## 2018-07-01 VITALS
BODY MASS INDEX: 25.5 KG/M2 | HEART RATE: 80 BPM | OXYGEN SATURATION: 93 % | SYSTOLIC BLOOD PRESSURE: 150 MMHG | WEIGHT: 188.05 LBS | DIASTOLIC BLOOD PRESSURE: 85 MMHG | TEMPERATURE: 97.8 F | RESPIRATION RATE: 17 BRPM

## 2018-07-01 PROBLEM — L03.114 CELLULITIS OF LEFT UPPER EXTREMITY: Status: RESOLVED | Noted: 2018-06-28 | Resolved: 2018-07-01

## 2018-07-01 PROBLEM — E87.6 HYPOKALEMIA: Status: RESOLVED | Noted: 2018-06-28 | Resolved: 2018-07-01

## 2018-07-01 PROBLEM — D72.829 LEUKOCYTOSIS: Status: RESOLVED | Noted: 2018-06-28 | Resolved: 2018-07-01

## 2018-07-01 PROBLEM — F42.4 COMPULSIVE SKIN PICKING: Status: ACTIVE | Noted: 2018-07-01

## 2018-07-01 LAB
BACTERIA WND AEROBE CULT: ABNORMAL
BASOPHILS # BLD AUTO: 0.3 % (ref 0–1.8)
BASOPHILS # BLD: 0.02 K/UL (ref 0–0.12)
EOSINOPHIL # BLD AUTO: 0.15 K/UL (ref 0–0.51)
EOSINOPHIL NFR BLD: 2.1 % (ref 0–6.9)
ERYTHROCYTE [DISTWIDTH] IN BLOOD BY AUTOMATED COUNT: 38.7 FL (ref 35.9–50)
GRAM STN SPEC: ABNORMAL
GRAM STN SPEC: ABNORMAL
HCT VFR BLD AUTO: 43.9 % (ref 42–52)
HGB BLD-MCNC: 15.3 G/DL (ref 14–18)
IMM GRANULOCYTES # BLD AUTO: 0.04 K/UL (ref 0–0.11)
IMM GRANULOCYTES NFR BLD AUTO: 0.6 % (ref 0–0.9)
LYMPHOCYTES # BLD AUTO: 2.18 K/UL (ref 1–4.8)
LYMPHOCYTES NFR BLD: 30.5 % (ref 22–41)
MCH RBC QN AUTO: 30.4 PG (ref 27–33)
MCHC RBC AUTO-ENTMCNC: 34.9 G/DL (ref 33.7–35.3)
MCV RBC AUTO: 87.3 FL (ref 81.4–97.8)
MONOCYTES # BLD AUTO: 0.5 K/UL (ref 0–0.85)
MONOCYTES NFR BLD AUTO: 7 % (ref 0–13.4)
NEUTROPHILS # BLD AUTO: 4.26 K/UL (ref 1.82–7.42)
NEUTROPHILS NFR BLD: 59.5 % (ref 44–72)
NRBC # BLD AUTO: 0 K/UL
NRBC BLD-RTO: 0 /100 WBC
PLATELET # BLD AUTO: 226 K/UL (ref 164–446)
PMV BLD AUTO: 10.6 FL (ref 9–12.9)
RBC # BLD AUTO: 5.03 M/UL (ref 4.7–6.1)
SIGNIFICANT IND 70042: ABNORMAL
SIGNIFICANT IND 70042: ABNORMAL
SITE SITE: ABNORMAL
SITE SITE: ABNORMAL
SOURCE SOURCE: ABNORMAL
SOURCE SOURCE: ABNORMAL
WBC # BLD AUTO: 7.2 K/UL (ref 4.8–10.8)

## 2018-07-01 PROCEDURE — 99217 PR OBSERVATION CARE DISCHARGE: CPT | Performed by: HOSPITALIST

## 2018-07-01 PROCEDURE — 99214 OFFICE O/P EST MOD 30 MIN: CPT | Performed by: INTERNAL MEDICINE

## 2018-07-01 PROCEDURE — A9270 NON-COVERED ITEM OR SERVICE: HCPCS | Performed by: HOSPITALIST

## 2018-07-01 PROCEDURE — 700105 HCHG RX REV CODE 258: Performed by: INTERNAL MEDICINE

## 2018-07-01 PROCEDURE — 36415 COLL VENOUS BLD VENIPUNCTURE: CPT

## 2018-07-01 PROCEDURE — A9270 NON-COVERED ITEM OR SERVICE: HCPCS | Performed by: ORTHOPAEDIC SURGERY

## 2018-07-01 PROCEDURE — 700112 HCHG RX REV CODE 229: Performed by: ORTHOPAEDIC SURGERY

## 2018-07-01 PROCEDURE — 700102 HCHG RX REV CODE 250 W/ 637 OVERRIDE(OP): Performed by: HOSPITALIST

## 2018-07-01 PROCEDURE — 700102 HCHG RX REV CODE 250 W/ 637 OVERRIDE(OP): Performed by: ORTHOPAEDIC SURGERY

## 2018-07-01 PROCEDURE — 700111 HCHG RX REV CODE 636 W/ 250 OVERRIDE (IP): Performed by: INTERNAL MEDICINE

## 2018-07-01 PROCEDURE — 96376 TX/PRO/DX INJ SAME DRUG ADON: CPT

## 2018-07-01 PROCEDURE — 85025 COMPLETE CBC W/AUTO DIFF WBC: CPT

## 2018-07-01 PROCEDURE — G0378 HOSPITAL OBSERVATION PER HR: HCPCS

## 2018-07-01 PROCEDURE — 700111 HCHG RX REV CODE 636 W/ 250 OVERRIDE (IP): Performed by: ORTHOPAEDIC SURGERY

## 2018-07-01 RX ORDER — SULFAMETHOXAZOLE AND TRIMETHOPRIM 800; 160 MG/1; MG/1
1 TABLET ORAL 2 TIMES DAILY
Qty: 20 TAB | Refills: 0 | Status: SHIPPED | OUTPATIENT
Start: 2018-07-01 | End: 2018-07-11

## 2018-07-01 RX ADMIN — DOCUSATE SODIUM 100 MG: 100 CAPSULE ORAL at 08:28

## 2018-07-01 RX ADMIN — AMPICILLIN SODIUM AND SULBACTAM SODIUM 3 G: 2; 1 INJECTION, POWDER, FOR SOLUTION INTRAMUSCULAR; INTRAVENOUS at 08:28

## 2018-07-01 RX ADMIN — KETOROLAC TROMETHAMINE 30 MG: 30 INJECTION, SOLUTION INTRAMUSCULAR at 00:12

## 2018-07-01 RX ADMIN — ACETAMINOPHEN 650 MG: 325 TABLET, FILM COATED ORAL at 00:11

## 2018-07-01 RX ADMIN — ACETAMINOPHEN 650 MG: 325 TABLET, FILM COATED ORAL at 05:03

## 2018-07-01 RX ADMIN — OXYCODONE HYDROCHLORIDE 10 MG: 10 TABLET ORAL at 10:02

## 2018-07-01 RX ADMIN — PAROXETINE HYDROCHLORIDE 5 MG: 20 TABLET, FILM COATED ORAL at 08:29

## 2018-07-01 RX ADMIN — KETOROLAC TROMETHAMINE 30 MG: 30 INJECTION, SOLUTION INTRAMUSCULAR at 12:03

## 2018-07-01 RX ADMIN — OXYCODONE HYDROCHLORIDE 10 MG: 10 TABLET ORAL at 03:24

## 2018-07-01 RX ADMIN — OXYCODONE HYDROCHLORIDE 10 MG: 10 TABLET ORAL at 00:11

## 2018-07-01 RX ADMIN — OXYCODONE HYDROCHLORIDE 10 MG: 10 TABLET ORAL at 06:26

## 2018-07-01 RX ADMIN — KETOROLAC TROMETHAMINE 30 MG: 30 INJECTION, SOLUTION INTRAMUSCULAR at 05:03

## 2018-07-01 RX ADMIN — VANCOMYCIN HYDROCHLORIDE 1300 MG: 100 INJECTION, POWDER, LYOPHILIZED, FOR SOLUTION INTRAVENOUS at 05:03

## 2018-07-01 RX ADMIN — ACETAMINOPHEN 650 MG: 325 TABLET, FILM COATED ORAL at 12:03

## 2018-07-01 RX ADMIN — AMPICILLIN SODIUM AND SULBACTAM SODIUM 3 G: 2; 1 INJECTION, POWDER, FOR SOLUTION INTRAMUSCULAR; INTRAVENOUS at 03:24

## 2018-07-01 ASSESSMENT — PAIN SCALES - GENERAL
PAINLEVEL_OUTOF10: 5
PAINLEVEL_OUTOF10: 3
PAINLEVEL_OUTOF10: 4
PAINLEVEL_OUTOF10: 7

## 2018-07-01 ASSESSMENT — ENCOUNTER SYMPTOMS
NERVOUS/ANXIOUS: 0
CHILLS: 0
FEVER: 0

## 2018-07-01 ASSESSMENT — PATIENT HEALTH QUESTIONNAIRE - PHQ9
2. FEELING DOWN, DEPRESSED, IRRITABLE, OR HOPELESS: NOT AT ALL
SUM OF ALL RESPONSES TO PHQ9 QUESTIONS 1 AND 2: 0
1. LITTLE INTEREST OR PLEASURE IN DOING THINGS: NOT AT ALL

## 2018-07-01 NOTE — PROGRESS NOTES
Infectious Disease Progress Note    Author: Lori Whalen M.D. Date & Time of service: 2018  1:07 PM    Chief Complaint:  Knee and arm abscess.      Interval History:  28-year-old white male wadmitted for multiple skin abscesses affecting his left upper extremity and his left knee   AF WBC 10.8 c/o anxiety-pain decreased   AF denies SE abx-anxiety less  Labs Reviewed, Medications Reviewed, Radiology Reviewed and Wound Reviewed.    Review of Systems:  Review of Systems   Constitutional: Negative for chills and fever.   Psychiatric/Behavioral: The patient is not nervous/anxious.    All other systems reviewed and are negative.      Hemodynamics:  Temp (24hrs), Av.4 °C (97.6 °F), Min:36.3 °C (97.3 °F), Max:36.6 °C (97.8 °F)  Temperature: 36.6 °C (97.8 °F)  Pulse  Av.1  Min: 58  Max: 97   Blood Pressure: 150/85       Physical Exam:  Physical Exam   Constitutional: He is oriented to person, place, and time. He appears well-developed and well-nourished. No distress.   HENT:   Head: Normocephalic.   Neck: Normal range of motion.   Cardiovascular: Normal heart sounds and intact distal pulses.    Pulmonary/Chest: Effort normal and breath sounds normal. No respiratory distress. He has no wheezes. He has no rales.   Abdominal: Soft. Bowel sounds are normal. He exhibits no distension. There is no tenderness. There is no rebound.   Musculoskeletal: He exhibits tenderness. He exhibits no edema.   Mild tenderness  Nearly resolved erythema  Surgical site left knee well-approx  No induration or drainage. Sutures in place   Neurological: He is alert and oriented to person, place, and time. A cranial nerve deficit is present.   Skin: Skin is warm. No rash noted.   Nursing note and vitals reviewed.      Meds:    Current Facility-Administered Medications:   •  morphine injection  •  dexamethasone  •  diphenhydrAMINE  •  haloperidol lactate  •  scopolamine  •  docusate sodium  •  senna-docusate  •   senna-docusate  •  polyethylene glycol/lytes  •  magnesium hydroxide  •  bisacodyl  •  fleet  •  acetaminophen  •  ketorolac  •  oxyCODONE immediate-release  •  oxyCODONE immediate release  •  NS  •  Respiratory Care per Protocol  •  NS  •  ampicillin-sulbactam (UNASYN) IV  •  MD ALERT... vancomycin  •  ondansetron  •  ondansetron  •  promethazine  •  promethazine  •  prochlorperazine  •  HYDROcodone-acetaminophen  •  vancomycin    Labs:  Recent Labs      06/28/18 2019 06/30/18   0539  07/01/18   0224   WBC  11.2*  10.8  7.2   RBC  5.35  4.99  5.03   HEMOGLOBIN  16.0  15.3  15.3   HEMATOCRIT  46.3  43.4  43.9   MCV  86.5  87.0  87.3   MCH  29.9  30.7  30.4   RDW  37.6  37.3  38.7   PLATELETCT  266  224  226   MPV  10.6  10.7  10.6   NEUTSPOLYS  74.60*  79.00*  59.50   LYMPHOCYTES  18.20*  12.90*  30.50   MONOCYTES  5.70  6.80  7.00   EOSINOPHILS  0.90  0.50  2.10   BASOPHILS  0.20  0.30  0.30     Recent Labs      06/28/18 2019 06/30/18   0539   SODIUM  143  142   POTASSIUM  3.3*  3.9   CHLORIDE  108  108   CO2  24  27   GLUCOSE  98  113*   BUN  17  14     Recent Labs      06/28/18 2019 06/30/18   0539   ALBUMIN  4.0   --    TBILIRUBIN  0.5   --    ALKPHOSPHAT  82   --    TOTPROTEIN  6.9   --    ALTSGPT  13   --    ASTSGOT  17   --    CREATININE  0.90  0.74       Imaging:  Dx-knee 3 Views Left    Result Date: 6/28/2018 6/28/2018 9:16 PM HISTORY/REASON FOR EXAM: Pain/Deformity Following Trauma TECHNIQUE/EXAM DESCRIPTION:  AP, lateral, and oblique views of the LEFT knee. COMPARISON:  None. FINDINGS: The bony structures and articulations appear within normal limits without visualized fracture, subluxation, or dislocation.     1.  No acute traumatic bony injury.      Micro:  Results     Procedure Component Value Units Date/Time    CULTURE WOUND W/ GRAM STAIN [[id number]]  (Abnormal)  (Susceptibility) Collected:  06/28/18 9602    Order Status:  Completed Specimen:  Wound from Left Leg Updated:  07/01/18 1213      Significant Indicator POS (POS)     Source WND     Site LEFT LEG     Culture Result Wound -- (A)     Gram Stain Result Rare WBCs.  Few Gram positive cocci.       Culture Result Wound Methicillin Resistant Staphylococcus aureus  Heavy growth   (A)    Narrative:       CALL  [facility]  131 tel. [phone],  CALLED  131 tel. [phone] 07/01/2018, 12:17, RB PERF. RESULTS CALLED  TO:[id number],RN;fax to inf ctl  Left knee    Culture & Susceptibility     METHICILLIN RESISTANT STAPHYLOCOCCUS AUREUS     Antibiotic Sensitivity Microscan Unit Status    Ampicillin/sulbactam Resistant 16/8 mcg/mL Final    Method: SENSITIVITY, VIDYA    Clindamycin Sensitive <=0.5 mcg/mL Final    Method: SENSITIVITY, VIDYA    Daptomycin Sensitive <=0.5 mcg/mL Final    Method: SENSITIVITY, VIDYA    Erythromycin Resistant >4 mcg/mL Final    Method: SENSITIVITY, VIDYA    Moxifloxacin Sensitive 2 mcg/mL Final    Method: SENSITIVITY, VIDYA    Oxacillin Resistant >2 mcg/mL Final    Method: SENSITIVITY, VIDYA    Penicillin Resistant >8 mcg/mL Final    Method: SENSITIVITY, VIDYA    Tetracycline Sensitive <=4 mcg/mL Final    Method: SENSITIVITY, VIDYA    Trimeth/Sulfa Sensitive <=0.5/9.5 mcg/mL Final    Method: SENSITIVITY, VIDYA    Vancomycin Sensitive 2 mcg/mL Final    Method: SENSITIVITY, VIDYA                       ANAEROBIC CULTURE [[id number]] Collected:  06/29/18 0945    Order Status:  Completed Specimen:  Tissue Updated:  06/30/18 1340     Significant Indicator NEG     Source TISS     Site Left Leg Abscess     Anaerobic Culture, Culture Res Culture in progress.    CULTURE WOUND W/ GRAM STAIN [[id number]]  (Abnormal) Collected:  06/29/18 0945    Order Status:  Completed Specimen:  Tissue Updated:  06/30/18 1340     Significant Indicator POS (POS)     Source TISS     Site Left Leg Abscess     Culture Result Wound -- (A)     Gram Stain Result Moderate WBCs.  Moderate Gram positive cocci.   (A)     Culture Result Wound Staphylococcus aureus  Heavy growth   (A)    GRAM STAIN  "[[id number]] Collected:  06/29/18 0945    Order Status:  Completed Specimen:  Tissue Updated:  06/29/18 2310     Significant Indicator .     Source TISS     Site Left Leg Abscess     Gram Stain Result Moderate WBCs.  Moderate Gram positive cocci.      BLOOD CULTURE [[id number]] Collected:  06/28/18 2049    Order Status:  Completed Specimen:  Blood from Peripheral Updated:  06/29/18 0753     Significant Indicator NEG     Source BLD     Site PERIPHERAL     Blood Culture No Growth    Note: Blood cultures are incubated for 5 days and  are monitored continuously.Positive blood cultures  are called to the RN and reported as soon as  they are identified.      Narrative:       1 of 2 for Blood Culture x 2 sites order. Per Hospital  Policy: Only change Specimen Src: to \"Line\" if specified by  physician order.    BLOOD CULTURE [[id number]] Collected:  06/28/18 2057    Order Status:  Completed Specimen:  Blood from Peripheral Updated:  06/29/18 0753     Significant Indicator NEG     Source BLD     Site PERIPHERAL     Blood Culture No Growth    Note: Blood cultures are incubated for 5 days and  are monitored continuously.Positive blood cultures  are called to the RN and reported as soon as  they are identified.      Narrative:       2 of 2 blood culture x2  Sites order. Per Hospital Policy:  Only change Specimen Src: to \"Line\" if specified by physician  order.    GRAM STAIN [[id number]] Collected:  06/28/18 2334    Order Status:  Completed Specimen:  Wound Updated:  06/29/18 0735     Significant Indicator .     Source WND     Site LEFT LEG     Gram Stain Result Rare WBCs.  Few Gram positive cocci.      Narrative:       Left knee          Assessment:  Active Hospital Problems    Diagnosis   • Abscess of left knee [L02.416]   • Cellulitis of left upper extremity [L03.114]   • Leukocytosis [D72.829]   • Hypokalemia [E87.6]       Plan:  Knee and arm abscesses/cellulitis.  Afebrile  Resolved  leukocytosis.    Wound cultures +MRSA  Blood " cultures x2 are negative   status post irrigation and debridement of the knee abscess -clinically much improved  As blood cultures remain negative and continued clinical improvement,   Change to PO Bactrim for 10 more days  Encouraged to stop picking at skin  Compliance importance reinforced   HIV, hep C neg    Anxiety  Per PCT    Discussed with internal medicine.Dr Loulou VARGHESE ID prn

## 2018-07-01 NOTE — PROGRESS NOTES
Pharmacy Kinetics 28 y.o. male on vancomycin day # 4   2018    Currently on Vancomycin 1300 mg iv q8hr (06, 14, 22)    Indication for Treatment: SSTI - knee and arm abscesses    Pertinent history per medical record: Admitted on 2018 for L knee and LUE abscesses. Patient reports a spider bite on his knee ~3 days PTA and also admits to picking at his skin. Patient initiated on empiric unasyn/vancomycin on admission and underwent I&D of the knee abscess on . ID and wound care consulting.    Other antibiotics: ampicillin/sulbactam 3 g IV q6hrs    Allergies: Patient has no known allergies.     No major concerns for renal function identified.    Pertinent cultures to date:   : L leg abscess cx - Heavy growth Staphylococcus aureus, sensitivities pending  : L leg wound cx - Heavy growth MRSA (vanco VIDYA 2)  : peripheral blood cx X2 - NGTD    Recent Labs      18   0539  18   0224   WBC  11.2*  10.8  7.2   NEUTSPOLYS  74.60*  79.00*  59.50     Recent Labs      18   0539   BUN  17  14   CREATININE  0.90  0.74   ALBUMIN  4.0   --      Recent Labs      18   0539   VANCOTROUGH  13.4     No intake or output data in the 24 hours ending 18 1245     Blood pressure 150/85, pulse 80, temperature 36.6 °C (97.8 °F), resp. rate 17, weight 85.3 kg (188 lb 0.8 oz), SpO2 93 %. Temp (24hrs), Av.4 °C (97.6 °F), Min:36.3 °C (97.3 °F), Max:36.6 °C (97.8 °F)      A/P   1. Vancomycin dose change: Continue current regimen  2. Next vancomycin level: ~2 days (not currently ordered)  3. Goal trough: 12-16 mcg/mL   4. Comments: No change in patient's overall status, wound cultures from admission positive for MRSA. No new renal labs for review today, no new concerns for renal function identified & therapeutic vanco trough noted . Will continue current vanco regimen and plan repeat trough in ~2 days to evaluate and adjust as necessary.     Pharmacy will  continue to follow.     Willam JimenezD

## 2018-07-01 NOTE — DISCHARGE SUMMARY
Discharge Summary    CHIEF COMPLAINT ON ADMISSION  Chief Complaint   Patient presents with   • Wound Infection     left knee        Reason for Admission  Bug Bite      Admission Date  6/28/2018    CODE STATUS  Full Code    HPI & HOSPITAL COURSE  This is a 28 y.o. Male admitted 6/28/2018 without prior medical conditions presented with left knee swelling for the past 3 days.  Patient reported increasing swelling, redness of his left lateral knee that started after a spider bite about 3 days ago which eventually ruptured with purulent drainage.  The patient also reports multiple smaller abscesses on his arms which she has been picking it with minimal drainage and redness.  The patient denies using IV drugs.  He did show me a photo of the lesion when it first developed with notable indentations surrounding it.  He stated that he had been squeezing it to get the pustule to drain 3 days ago prior to it becoming red, swollen with escoriation to the skin surface.   He had an I&D of the knee abscess with MRSA on abscess fluid culture ss Bactrim.  ID consulted, recommended 10 days bactrim DS.   His wound looked CD&I on exam after removal of bandage, sutures remain intact and dry, no erythema.       Therefore, he is discharged in good and stable condition to home with close outpatient follow-up.    The patient met 2-midnight criteria for an inpatient stay at the time of discharge.    Discharge Date  7/1/2018    FOLLOW UP ITEMS POST DISCHARGE  Follow up Dr. Carnes in 7-10 days for suture removal and recheck.      DISCHARGE DIAGNOSES  Active Problems:    Abscess of left knee POA: Yes    Anxiety, generalized POA: Yes    Compulsive skin picking POA: Yes  Resolved Problems:    Cellulitis of left upper extremity POA: Yes    Leukocytosis POA: Yes    Hypokalemia POA: Yes      FOLLOW UP  No future appointments.  No follow-up provider specified.    MEDICATIONS ON DISCHARGE     Medication List      START taking these medications       Instructions   sulfamethoxazole-trimethoprim 800-160 MG tablet  Commonly known as:  BACTRIM DS   Take 1 Tab by mouth 2 times a day for 10 days.  Dose:  1 Tab            Allergies  No Known Allergies    DIET  Orders Placed This Encounter   Procedures   • Diet Order Regular     Standing Status:   Standing     Number of Occurrences:   1     Order Specific Question:   Diet:     Answer:   Regular [1]       ACTIVITY  As tolerated.  Weight bearing as tolerated    CONSULTATIONS  Dr. Marlee Carnes    PROCEDURES  DATE OF SERVICE:  06/29/2018     PREOPERATIVE DIAGNOSIS:  Deep abscess, left leg.     POSTOPERATIVE DIAGNOSIS:  Deep abscess, left leg.     PROCEDURE:  Irrigation and debridement of left leg abscess.     SURGEON:  Caterina Balderas MD     ASSISTANT:  Irvin Newell PA-C     ESTIMATED BLOOD LOSS:  Minimal.     INDICATIONS:  This is a 28-year-old male who presented with a lateral leg   abscess with gross purulent drainage.  Risks and benefits of irrigation and   debridement were discussed, which include but not limited to bleeding,   infection, neurovascular damage, pain, stiffness, and need for further   surgery.  He understands all these risks and wished to proceed.     DESCRIPTION OF PROCEDURE:  Patient was sedated with LMA anesthesia and   administered perioperative antibiotics.  Left lower extremity was prepped in   usual fashion.  His abscess was excised and debrided of skin, subcutaneous   tissue, and underlying muscle with a knife and rongeur in an excisional   fashion along its 3x4 cm area.  Wounds were then irrigated, closed with nylon   suture.  Sterile dressings were applied.  Patient tolerated the procedure   well.     PLAN:  The patient admitted for IV antibiotic therapy while awaiting   definitive diagnosis.        ____________________________________     CATERINA BALDERAS MD       Xray left knee.      1.  No acute traumatic bony injury.   Reading Provider Reading Date   Lizzy Contreras M.D.  Jun 28, 2018       LABORATORY  Lab Results   Component Value Date    SODIUM 142 06/30/2018    POTASSIUM 3.9 06/30/2018    CHLORIDE 108 06/30/2018    CO2 27 06/30/2018    GLUCOSE 113 (H) 06/30/2018    BUN 14 06/30/2018    CREATININE 0.74 06/30/2018        Lab Results   Component Value Date    WBC 7.2 07/01/2018    HEMOGLOBIN 15.3 07/01/2018    HEMATOCRIT 43.9 07/01/2018    PLATELETCT 226 07/01/2018        Total time of the discharge process exceeds 50 minutes.

## 2018-07-01 NOTE — CARE PLAN
Problem: Safety  Goal: Will remain free from falls  Patient calls for assist, steady up ambulating.

## 2018-07-01 NOTE — CARE PLAN
Problem: Pain Management  Goal: Pain level will decrease to patient's comfort goal  PO Pain mediations.

## 2018-07-01 NOTE — PROGRESS NOTES
Patient discharged home, all belongings with patient. IV removed. Dressing changed to left lateral knee sutures intact. Wound is positive for MRSA, I gave patient education and hand out  on MRSA  Infection. Discharge complete, RX given to patient.

## 2018-07-01 NOTE — DISCHARGE INSTRUCTIONS
Discharge Instructions    Discharged to home by car with relative. Discharged via wheelchair, hospital escort: Yes.  Special equipment needed: Not Applicable    Be sure to schedule a follow-up appointment with your primary care doctor or any specialists as instructed.     Discharge Plan:   Influenza Vaccine Indication: Patient Refuses  Discharge instructions for the Orthopedic Patient    Follow up with Primary Care Physician within 2 weeks of discharge to home, regarding:  Review of medications and diagnostic testing.  Surveillance for medical complications.  Workup and treatment of osteoporosis, if appropriate.     -Is this a Joint Replacement patient? No    -Is this patient being discharged with medication to prevent blood clots?  No  I understand that a diet low in cholesterol, fat, and sodium is recommended for good health. Unless I have been given specific instructions below for another diet, I accept this instruction as my diet prescription.   Other diet: As tolerated    Special Instructions:     · Is patient discharged on Warfarin / Coumadin?   No     Depression / Suicide Risk    As you are discharged from this [facility] facility, it is important to learn how to keep safe from harming yourself.    Recognize the warning signs:  · Abrupt changes in personality, positive or negative- including increase in energy   · Giving away possessions  · Change in eating patterns- significant weight changes-  positive or negative  · Change in sleeping patterns- unable to sleep or sleeping all the time   · Unwillingness or inability to communicate  · Depression  · Unusual sadness, discouragement and loneliness  · Talk of wanting to die  · Neglect of personal appearance   · Rebelliousness- reckless behavior  · Withdrawal from people/activities they love  · Confusion- inability to concentrate     If you or a loved one observes any of these behaviors or has concerns about self-harm, here's what you can do:  · Talk about it-  your feelings and reasons for harming yourself  · Remove any means that you might use to hurt yourself (examples: pills, rope, extension cords, firearm)  · Get professional help from the community (Mental Health, Substance Abuse, psychological counseling)  · Do not be alone:Call your Safe Contact- someone whom you trust who will be there for you.  · Call your local CRISIS HOTLINE 175-4543 or 527-398-2932  · Call your local Children's Mobile Crisis Response Team [place] (999) 301-1437 or www.[place]  · Call the toll free National Suicide Prevention Hotlines   · National Suicide Prevention Lifeline 211-271-FWSJ (7919)  · National Hope Line Network 800-SUICIDE (970-5105)

## 2018-07-02 LAB
BACTERIA SPEC ANAEROBE CULT: NORMAL
SIGNIFICANT IND 70042: NORMAL
SITE SITE: NORMAL
SOURCE SOURCE: NORMAL

## 2018-07-03 LAB
BACTERIA BLD CULT: NORMAL
BACTERIA BLD CULT: NORMAL
SIGNIFICANT IND 70042: NORMAL
SIGNIFICANT IND 70042: NORMAL
SITE SITE: NORMAL
SITE SITE: NORMAL
SOURCE SOURCE: NORMAL
SOURCE SOURCE: NORMAL

## 2019-03-01 ENCOUNTER — OFFICE VISIT (OUTPATIENT)
Dept: URGENT CARE | Facility: CLINIC | Age: 29
End: 2019-03-01
Payer: COMMERCIAL

## 2019-03-01 VITALS
RESPIRATION RATE: 16 BRPM | HEART RATE: 78 BPM | OXYGEN SATURATION: 99 % | SYSTOLIC BLOOD PRESSURE: 122 MMHG | WEIGHT: 188 LBS | BODY MASS INDEX: 25.47 KG/M2 | TEMPERATURE: 98.6 F | HEIGHT: 72 IN | DIASTOLIC BLOOD PRESSURE: 76 MMHG

## 2019-03-01 DIAGNOSIS — Z20.7 EXPOSURE TO HEAD LICE: ICD-10-CM

## 2019-03-01 PROCEDURE — 99212 OFFICE O/P EST SF 10 MIN: CPT | Performed by: PHYSICIAN ASSISTANT

## 2019-03-01 NOTE — LETTER
March 1, 2019         Patient: Gera Benedict   YOB: 1990   Date of Visit: 3/1/2019           To Whom it May Concern:    Gera Benedict was seen in my clinic on 3/1/2019.   Please excuse him from missed work 02/27/19-03/01/19    If you have any questions or concerns, please don't hesitate to call.        Sincerely,           Cahr Welch P.A.-C.  Electronically Signed

## 2019-03-03 NOTE — PROGRESS NOTES
Chief Complaint   Patient presents with   • Other     kids exposed to head lice x2 days, worried he may it and needs a note for work        HISTORY OF PRESENT ILLNESS: Patient is a 29 y.o. 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  Patient states that he did treat himself with Nix as well x 2 in the last few days as a precaution.  He already cleaned and sanitized the house.  He requests note for missed work.   No current symptoms but did want to get checked.     Patient Active Problem List    Diagnosis Date Noted   • Abscess of left knee-MRSA 2018     Priority: High   • Compulsive skin picking 2018   • Anxiety, generalized 2018   • Lumbosacral spondylosis 2015   • DDD (degenerative disc disease), lumbar 2015   • Bilateral lumbar radiculopathy 2015   • Muscle spasm of back 2015   • Seasonal allergies 2015   • Chronic low back pain 2015       Allergies:Patient has no known allergies.    No current [facility]-ordered outpatient prescriptions on file.     No current [facility]-ordered facility-administered medications on file.        Past Medical History:   Diagnosis Date   • Chronic back pain        Social History   Substance Use Topics   • Smoking status: Current Some Day Smoker   • Smokeless tobacco: Never Used      Comment: avoid all tobacco products   • Alcohol use 2.0 oz/week     4 Cans of beer per week       Family Status   Relation Status   • Mo Alive   • Fa      Family History   Problem Relation Age of Onset   • Other Mother         unknown   • Other Father         unknown       ROS:  Review of Systems   Constitutional: Negative for fever, chills, weight loss and malaise/fatigue.   HENT: Negative for ear pain, nosebleeds, congestion, sore throat and neck pain.    Eyes: Negative for blurred vision.   Respiratory: Negative for cough, sputum production, shortness of  breath and wheezing.    Cardiovascular: Negative for chest pain, palpitations, orthopnea and leg swelling.   Gastrointestinal: Negative for heartburn, nausea, vomiting and abdominal pain.       Exam:  Blood pressure 122/76, pulse 78, temperature 37 °C (98.6 °F), temperature source Temporal, resp. rate 16, height 1.829 m (6'), weight 85.3 kg (188 lb), SpO2 99 %.  General:  Well nourished, well developed male in NAD  Eyes: PERRLA, EOM within normal limits, no conjunctival injection, no scleral icterus, visual fields and acuity grossly intact.  Ears: Normal shape and symmetryt  Nose: Symmetrical, sinuses without tenderness, no discharge.   Neck: no masses, range of motion within normal limits, no tracheal deviation. No lymphadenopathy  Pulmonary: Normal respiratory effort, no wheezes, crackles, or rhonchi.  Cardiovascular: regular rate and rhythm without murmurs, rubs, or gallops..  Skin: No visible rashes or lesion. Warm, pink, dry.  Scalp inspected carefully without any evidence of lice/nits  Extremities: no clubbing, cyanosis, or edema.  Neuro: A&O x 3. Speech normal/clear.  Normal gait.         Assessment/Plan:  1. Exposure to head lice         -patient already treated as precaution x 2.   -no evidence of active infection   -work note provided       Supportive care, differential diagnoses, and indications for immediate follow-up discussed with patient.   Pathogenesis of diagnosis discussed including typical length and natural progression.   Instructed to return to clinic or nearest emergency department for any change in condition, further concerns, or worsening of symptoms.  Patient states understanding of the plan of care and discharge instructions.          Char Welch P.A.-C.          Supportive care, differential diagnoses, and indications for immediate follow-up discussed with patient.   Pathogenesis of diagnosis discussed including typical length and natural progression.   Instructed to return to  clinic or nearest emergency department for any change in condition, further concerns, or worsening of symptoms.  Patient states understanding of the plan of care and discharge instructions.  Instructed to make an appointment, for follow up, with their primary care provider.      Char Welch P.A.-C.

## 2020-02-19 ENCOUNTER — OFFICE VISIT (OUTPATIENT)
Dept: URGENT CARE | Facility: PHYSICIAN GROUP | Age: 30
End: 2020-02-19

## 2020-02-19 VITALS
SYSTOLIC BLOOD PRESSURE: 122 MMHG | DIASTOLIC BLOOD PRESSURE: 78 MMHG | RESPIRATION RATE: 15 BRPM | WEIGHT: 180 LBS | OXYGEN SATURATION: 98 % | HEART RATE: 82 BPM | TEMPERATURE: 99 F | BODY MASS INDEX: 25.77 KG/M2 | HEIGHT: 70 IN

## 2020-02-19 DIAGNOSIS — R68.89 FLU-LIKE SYMPTOMS: ICD-10-CM

## 2020-02-19 PROCEDURE — 99213 OFFICE O/P EST LOW 20 MIN: CPT | Performed by: NURSE PRACTITIONER

## 2020-02-19 NOTE — PROGRESS NOTES
Subjective:      Gera Benedict is a 30 y.o. male who presents with Letter for School/Work (out last thurs fri sat)    Past Medical History:   Diagnosis Date   • Chronic back pain      Social History     Socioeconomic History   • Marital status: Single     Spouse name: Not on file   • Number of children: Not on file   • Years of education: Not on file   • Highest education level: Not on file   Occupational History   • Not on file   Social Needs   • Financial resource strain: Not on file   • Food insecurity     Worry: Not on file     Inability: Not on file   • Transportation needs     Medical: Not on file     Non-medical: Not on file   Tobacco Use   • Smoking status: Current Some Day Smoker   • Smokeless tobacco: Never Used   • Tobacco comment: avoid all tobacco products   Substance and Sexual Activity   • Alcohol use: Yes     Alcohol/week: 2.0 oz     Types: 4 Cans of beer per week   • Drug use: Yes     Comment: marijuana   • Sexual activity: Yes     Partners: Female   Lifestyle   • Physical activity     Days per week: Not on file     Minutes per session: Not on file   • Stress: Not on file   Relationships   • Social connections     Talks on phone: Not on file     Gets together: Not on file     Attends Muslim service: Not on file     Active member of club or organization: Not on file     Attends meetings of clubs or organizations: Not on file     Relationship status: Not on file   • Intimate partner violence     Fear of current or ex partner: Not on file     Emotionally abused: Not on file     Physically abused: Not on file     Forced sexual activity: Not on file   Other Topics Concern   • Not on file   Social History Narrative   • Not on file     Family History   Problem Relation Age of Onset   • Other Mother         unknown   • Other Father         unknown       Allergies :Patient has no known allergies.     Patient is a 30-year-old male who presents today for checkup and note to return to work.  Patient states  "both of his daughters were diagnosed with influenza with positive tests.  Last week, patient also had flulike symptoms including fever, aches, chills, and respiratory symptoms.  Patient is feeling better at this time and is required to have a note to return to work.          Other   This is a new problem. The problem has been resolved. Nothing aggravates the symptoms. He has tried nothing for the symptoms.       Review of Systems   All other systems reviewed and are negative.         Objective:     /78   Pulse 82   Temp 37.2 °C (99 °F)   Resp 15   Ht 1.778 m (5' 10\")   Wt 81.6 kg (180 lb)   SpO2 98%   BMI 25.83 kg/m²      Physical Exam  Vitals signs reviewed.   Constitutional:       Appearance: Normal appearance.   HENT:      Head: Normocephalic.      Right Ear: Tympanic membrane, ear canal and external ear normal.      Left Ear: Tympanic membrane, ear canal and external ear normal.      Nose: Nose normal.      Mouth/Throat:      Mouth: Mucous membranes are moist.   Eyes:      Extraocular Movements: Extraocular movements intact.      Conjunctiva/sclera: Conjunctivae normal.      Pupils: Pupils are equal, round, and reactive to light.   Neck:      Musculoskeletal: Normal range of motion and neck supple.   Cardiovascular:      Rate and Rhythm: Normal rate and regular rhythm.   Pulmonary:      Effort: Pulmonary effort is normal.      Breath sounds: Normal breath sounds.   Musculoskeletal: Normal range of motion.   Skin:     General: Skin is warm and dry.      Capillary Refill: Capillary refill takes less than 2 seconds.   Neurological:      Mental Status: He is alert and oriented to person, place, and time.   Psychiatric:         Mood and Affect: Mood normal.         Behavior: Behavior normal.         Thought Content: Thought content normal.         Judgment: Judgment normal.                 Assessment/Plan:   Flulike symptoms, resolved  Exposure to influenza    Push fluids  Tylenol/Motrin as needed  Note " given to return to work  Call or return to urgent care otherwise for any further questions or concerns    There are no diagnoses linked to this encounter.

## 2020-02-19 NOTE — LETTER
February 19, 2020       Patient: Gera Benedict   YOB: 1990   Date of Visit: 2/19/2020         To Whom It May Concern:    It is my medical opinion that Gera Benedict may return to work on 2/20/20. He was ill on 2/13, 2/14, and 2/15 with flu symptoms.     If you have any questions or concerns, please don't hesitate to call [phone]          Sincerely,          Cathey J Hamman, A.P.N.  Electronically Signed

## 2022-01-21 ENCOUNTER — APPOINTMENT (OUTPATIENT)
Dept: URGENT CARE | Facility: CLINIC | Age: 32
End: 2022-01-21
Payer: COMMERCIAL

## 2022-01-21 ENCOUNTER — OFFICE VISIT (OUTPATIENT)
Dept: URGENT CARE | Facility: CLINIC | Age: 32
End: 2022-01-21
Payer: COMMERCIAL

## 2022-01-21 ENCOUNTER — HOSPITAL ENCOUNTER (OUTPATIENT)
Facility: MEDICAL CENTER | Age: 32
End: 2022-01-21
Attending: NURSE PRACTITIONER
Payer: COMMERCIAL

## 2022-01-21 VITALS
RESPIRATION RATE: 12 BRPM | BODY MASS INDEX: 25.77 KG/M2 | TEMPERATURE: 98.3 F | OXYGEN SATURATION: 99 % | SYSTOLIC BLOOD PRESSURE: 106 MMHG | WEIGHT: 180 LBS | HEIGHT: 70 IN | DIASTOLIC BLOOD PRESSURE: 60 MMHG | HEART RATE: 95 BPM

## 2022-01-21 DIAGNOSIS — R05.9 COUGH: ICD-10-CM

## 2022-01-21 DIAGNOSIS — J98.8 RTI (RESPIRATORY TRACT INFECTION): ICD-10-CM

## 2022-01-21 DIAGNOSIS — J02.9 PHARYNGITIS, UNSPECIFIED ETIOLOGY: ICD-10-CM

## 2022-01-21 LAB
INT CON NEG: NEGATIVE
INT CON POS: POSITIVE
S PYO AG THROAT QL: NEGATIVE

## 2022-01-21 PROCEDURE — U0005 INFEC AGEN DETEC AMPLI PROBE: HCPCS

## 2022-01-21 PROCEDURE — U0003 INFECTIOUS AGENT DETECTION BY NUCLEIC ACID (DNA OR RNA); SEVERE ACUTE RESPIRATORY SYNDROME CORONAVIRUS 2 (SARS-COV-2) (CORONAVIRUS DISEASE [COVID-19]), AMPLIFIED PROBE TECHNIQUE, MAKING USE OF HIGH THROUGHPUT TECHNOLOGIES AS DESCRIBED BY CMS-2020-01-R: HCPCS

## 2022-01-21 PROCEDURE — 99213 OFFICE O/P EST LOW 20 MIN: CPT | Performed by: NURSE PRACTITIONER

## 2022-01-21 PROCEDURE — 87880 STREP A ASSAY W/OPTIC: CPT | Performed by: NURSE PRACTITIONER

## 2022-01-21 ASSESSMENT — ENCOUNTER SYMPTOMS
MYALGIAS: 0
SHORTNESS OF BREATH: 0
CHILLS: 1
DIZZINESS: 0
FEVER: 0
VOMITING: 0
NAUSEA: 0
HEADACHES: 1
EYE PAIN: 0
WHEEZING: 0
SORE THROAT: 1
RHINORRHEA: 1
COUGH: 1

## 2022-01-21 NOTE — LETTER
January 21, 2022         Patient: Gera Benedict   YOB: 1990   Date of Visit: 1/21/2022           To Whom it May Concern:    The patient's presenting symptoms and exam findings most likely are due to a viral etiology.     Test for COVID-19 via PCR. Result will be reviewed by myself. We will call/message back for results and appropriate further instructions. Instructed to sign up for [employer] if they have not already. Result will be automatically released to [employer] application for patient review. I will be sending a message with Next Step Instructions to [employer] soon after resulted.   Symptomatic and supportive care:   Plenty of oral hydration and rest   Over the counter cough suppressant as directed.  Tylenol or ibuprofen for pain and fever as directed.   Warm salt water gargles for sore throat, soft foods, cool liquids.   Saline nasal spray and Flonase as a decongestant.   Infection control measures at home. Stay away from people, Hand washing, covering sneeze/cough, disinfect surfaces.   Remain home from work, school, and other populated environments. Work note provided with information of quarantine measures per CDC guidelines.   Overall, the patient is well-appearing. They are not hypoxic, afebrile, and a normal pulmonary exam.

## 2022-01-21 NOTE — PROGRESS NOTES
Subjective:   Gera Benedict is a 32 y.o. male who presents for Coronavirus Screening (Pt has a sore throat, headache, fatigue, chills x 5 days )      URI   This is a new problem. The current episode started in the past 7 days (exposed covid). The problem has been unchanged. The maximum temperature recorded prior to his arrival was 100.4 - 100.9 F. The fever has been present for 1 to 2 days. Associated symptoms include congestion, coughing, headaches, rhinorrhea and a sore throat. Pertinent negatives include no chest pain, ear pain, nausea, plugged ear sensation, rash, vomiting or wheezing. He has tried acetaminophen and sleep for the symptoms. The treatment provided no relief.       Review of Systems   Constitutional: Positive for chills and malaise/fatigue. Negative for fever.   HENT: Positive for congestion, rhinorrhea and sore throat. Negative for ear pain.    Eyes: Negative for pain.   Respiratory: Positive for cough. Negative for shortness of breath and wheezing.    Cardiovascular: Negative for chest pain.   Gastrointestinal: Negative for nausea and vomiting.   Genitourinary: Negative for hematuria.   Musculoskeletal: Negative for myalgias.   Skin: Negative for rash.   Neurological: Positive for headaches. Negative for dizziness.       Medications:    • This patient does not have an active medication from one of the medication groupers.    Allergies: Patient has no known allergies.    Problem List: Gera Benedict does not have any pertinent problems on file.    Surgical History:  Past Surgical History:   Procedure Laterality Date   • IRRIGATION & DEBRIDEMENT ORTHO Left 6/29/2018    Procedure: IRRIGATION & DEBRIDEMENT ORTHO-LEG;  Surgeon: Jay Ballard M.D.;  Location: SURGERY [facility];  Service: Orthopedics       Past Social Hx: Gera Benedict  reports that he has been smoking. He has never used smokeless tobacco. He reports current alcohol use of about 2.0 oz of alcohol per week. He reports  current drug use. Drug: Marijuana.     Past Family Hx:  Gera Benedict family history includes Other in his father and mother.     Problem list, medications, and allergies reviewed by myself today in Epic.     Objective:     Vitals:    01/21/22 1609   BP: 106/60   Pulse: 95   Resp: 12   Temp: 36.8 °C (98.3 °F)   SpO2: 99%       Physical Exam  Vitals and nursing note reviewed.   Constitutional:       General: He is not in acute distress.     Appearance: He is well-developed.   HENT:      Head: Normocephalic and atraumatic.      Right Ear: Tympanic membrane and external ear normal.      Left Ear: Tympanic membrane and external ear normal.      Nose: Nose normal.      Right Sinus: No maxillary sinus tenderness or frontal sinus tenderness.      Left Sinus: No maxillary sinus tenderness or frontal sinus tenderness.      Mouth/Throat:      Mouth: Mucous membranes are moist.      Pharynx: Uvula midline. No posterior oropharyngeal erythema.      Tonsils: No tonsillar exudate or tonsillar abscesses.   Eyes:      General:         Right eye: No discharge.         Left eye: No discharge.      Conjunctiva/sclera: Conjunctivae normal.   Cardiovascular:      Rate and Rhythm: Normal rate.   Pulmonary:      Effort: Pulmonary effort is normal. No respiratory distress.      Breath sounds: Normal breath sounds.   Abdominal:      General: There is no distension.   Musculoskeletal:         General: Normal range of motion.   Skin:     General: Skin is warm and dry.   Neurological:      General: No focal deficit present.      Mental Status: He is alert and oriented to person, place, and time. Mental status is at baseline.      Gait: Gait (gait at baseline) normal.   Psychiatric:         Judgment: Judgment normal.         Assessment/Plan:     Diagnosis and associated orders:     1. Cough  SARS-CoV-2 PCR (24 hour In-House): Collect NP swab in [facility]    POCT Rapid Strep A   2. RTI (respiratory tract infection)     3. Pharyngitis, unspecified  etiology          Comments/MDM:   Strep negative  The patient's presenting symptoms and exam findings most likely are due to a viral etiology.     Test for COVID-19 via PCR. Result will be reviewed by myself. We will call/message back for results and appropriate further instructions. Instructed to sign up for [employer] if they have not already. Result will be automatically released to [employer] application for patient review. I will be sending a message with Next Step Instructions to [employer] soon after resulted.   Symptomatic and supportive care:   Plenty of oral hydration and rest   Over the counter cough suppressant as directed.  Tylenol or ibuprofen for pain and fever as directed.   Warm salt water gargles for sore throat, soft foods, cool liquids.   Saline nasal spray and Flonase as a decongestant.   Infection control measures at home. Stay away from people, Hand washing, covering sneeze/cough, disinfect surfaces.   Remain home from work, school, and other populated environments. Work note provided with information of quarantine measures per CDC guidelines.   Overall, the patient is well-appearing. They are not hypoxic, afebrile, and a normal pulmonary exam.      •                Please note that this dictation was created using voice recognition software. I have made a reasonable attempt to correct obvious errors, but I expect that there are errors of grammar and possibly content that I did not discover before finalizing the note.    This note was electronically signed by Catracho BATISTA.

## 2022-01-22 DIAGNOSIS — R05.9 COUGH: ICD-10-CM

## 2022-01-22 LAB — COVID ORDER STATUS COVID19: NORMAL

## 2022-01-23 LAB
SARS-COV-2 RNA RESP QL NAA+PROBE: NOTDETECTED
SPECIMEN SOURCE: NORMAL

## 2024-05-12 ENCOUNTER — HOSPITAL ENCOUNTER (OUTPATIENT)
Facility: MEDICAL CENTER | Age: 34
End: 2024-05-12
Attending: PHYSICIAN ASSISTANT
Payer: COMMERCIAL

## 2024-05-12 ENCOUNTER — OFFICE VISIT (OUTPATIENT)
Dept: URGENT CARE | Facility: CLINIC | Age: 34
End: 2024-05-12

## 2024-05-12 VITALS
HEART RATE: 69 BPM | RESPIRATION RATE: 16 BRPM | HEIGHT: 70 IN | BODY MASS INDEX: 28.13 KG/M2 | OXYGEN SATURATION: 99 % | WEIGHT: 196.5 LBS | TEMPERATURE: 97.7 F | SYSTOLIC BLOOD PRESSURE: 126 MMHG | DIASTOLIC BLOOD PRESSURE: 72 MMHG

## 2024-05-12 DIAGNOSIS — R31.9 HEMATURIA, UNSPECIFIED TYPE: ICD-10-CM

## 2024-05-12 LAB
APPEARANCE UR: CLEAR
BILIRUB UR STRIP-MCNC: NEGATIVE MG/DL
COLOR UR AUTO: YELLOW
GLUCOSE UR STRIP.AUTO-MCNC: NEGATIVE MG/DL
KETONES UR STRIP.AUTO-MCNC: NEGATIVE MG/DL
LEUKOCYTE ESTERASE UR QL STRIP.AUTO: NEGATIVE
NITRITE UR QL STRIP.AUTO: NEGATIVE
PH UR STRIP.AUTO: 7.5 [PH] (ref 5–8)
PROT UR QL STRIP: NEGATIVE MG/DL
RBC UR QL AUTO: NEGATIVE
SP GR UR STRIP.AUTO: 1.02
UROBILINOGEN UR STRIP-MCNC: 0.2 MG/DL

## 2024-05-12 PROCEDURE — 3074F SYST BP LT 130 MM HG: CPT | Performed by: PHYSICIAN ASSISTANT

## 2024-05-12 PROCEDURE — 99213 OFFICE O/P EST LOW 20 MIN: CPT | Performed by: PHYSICIAN ASSISTANT

## 2024-05-12 PROCEDURE — 3078F DIAST BP <80 MM HG: CPT | Performed by: PHYSICIAN ASSISTANT

## 2024-05-12 PROCEDURE — 81002 URINALYSIS NONAUTO W/O SCOPE: CPT | Performed by: PHYSICIAN ASSISTANT

## 2024-05-12 ASSESSMENT — ENCOUNTER SYMPTOMS
EYE PAIN: 0
ABDOMINAL PAIN: 0
CHILLS: 0
COUGH: 0
MYALGIAS: 0
VOMITING: 0
FEVER: 0
CONSTIPATION: 0
NAUSEA: 0
SHORTNESS OF BREATH: 0
HEADACHES: 0
DIARRHEA: 0
SORE THROAT: 0

## 2024-05-12 NOTE — PROGRESS NOTES
"Subjective:   Gera Benedict is a 34 y.o. male who presents for Other (Blood in urine x6 months ago every time has drinks/ hard to stay hydrated/ dizzy/ \" not well\"/ urine coming out brown)      Patient notes months to years history of \"dizzy spells\".  Typically he starts drinking water and notes resolution of these symptoms.  However over last 6 months patient has noted discolored urine, potentially blood.  Prior history of heavy alcohol consumption.      Review of Systems   Constitutional:  Negative for chills and fever.   HENT:  Negative for congestion, ear pain and sore throat.    Eyes:  Negative for pain.   Respiratory:  Negative for cough and shortness of breath.    Cardiovascular:  Negative for chest pain.   Gastrointestinal:  Negative for abdominal pain, constipation, diarrhea, nausea and vomiting.   Genitourinary:  Positive for hematuria. Negative for dysuria.   Musculoskeletal:  Negative for myalgias.   Skin:  Negative for rash.   Neurological:  Negative for headaches.       Medications, Allergies, and current problem list reviewed today in Epic.     Objective:     /72   Pulse 69   Temp 36.5 °C (97.7 °F) (Temporal)   Resp 16   Ht 1.778 m (5' 10\")   Wt 89.1 kg (196 lb 8 oz)   SpO2 99%     Physical Exam  Vitals reviewed.   Constitutional:       Appearance: Normal appearance.   HENT:      Head: Normocephalic and atraumatic.      Right Ear: External ear normal.      Left Ear: External ear normal.      Nose: Nose normal.      Mouth/Throat:      Mouth: Mucous membranes are moist.   Eyes:      Conjunctiva/sclera: Conjunctivae normal.   Cardiovascular:      Rate and Rhythm: Normal rate and regular rhythm.   Pulmonary:      Effort: Pulmonary effort is normal.      Breath sounds: Normal breath sounds.   Abdominal:      Tenderness: There is no abdominal tenderness. There is no guarding or rebound.   Skin:     General: Skin is warm and dry.      Capillary Refill: Capillary refill takes less than 2 seconds. "   Neurological:      Mental Status: He is alert and oriented to person, place, and time.         Assessment/Plan:     Diagnosis and associated orders:     1. Hematuria, unspecified type  Referral to establish with PCP    POCT Urinalysis    URINE CULTURE(NEW)         Comments/MDM:     Patient describes episodes of dehydration that led to some orthostatic dizziness worse when his urine is quite concentrated, he has been poorly hydrated and when it is hotter he is exerting himself.  All his symptoms seem to resolve with hydration so I do believe this is fundamental.  He reports that he will go up to a week without drinking any water until his symptoms get quite severe.  His urinalysis is normal here, mildly concentrated but no evidence of proteinuria or other process that would suggest kidney failure.  Will rule out infection with urine culture but recommend more aggressive fluid hydration regularly, potentially with electrolyte replacement.  Recommend follow-up with primary which she was quite amenable to.  Discussed return and ER precaution         Differential diagnosis, natural history, supportive care, and indications for immediate follow-up discussed.    Advised the patient to follow-up with the primary care physician for recheck, reevaluation, and consideration of further management.    Please note that this dictation was created using voice recognition software. I have made a reasonable attempt to correct obvious errors, but I expect that there are errors of grammar and possibly content that I did not discover before finalizing the note.    This note was electronically signed by Dawson Harper PA-C

## 2024-05-12 NOTE — LETTER
May 12, 2024    To Whom It May Concern:         This is confirmation that Gera Benedict attended his scheduled appointment with Dawson Harper P.A.-C. on 5/12/24.  Please excuse absence due to illness from 5/7-5/10.  He is cleared to return at next visit.          If you have any questions please do not hesitate to call me at the phone number listed below.    Sincerely,          Dawson Harper P.A.-C.  [phone]

## 2024-05-14 LAB
BACTERIA UR CULT: NORMAL
SIGNIFICANT IND 70042: NORMAL
SITE SITE: NORMAL
SOURCE SOURCE: NORMAL

## 2024-06-17 ENCOUNTER — APPOINTMENT (OUTPATIENT)
Dept: MEDICAL GROUP | Facility: MEDICAL CENTER | Age: 34
End: 2024-06-17
Attending: PHYSICIAN ASSISTANT

## 2025-06-10 ENCOUNTER — HOSPITAL ENCOUNTER (EMERGENCY)
Facility: MEDICAL CENTER | Age: 35
End: 2025-06-10
Attending: EMERGENCY MEDICINE

## 2025-06-10 VITALS
HEIGHT: 70 IN | TEMPERATURE: 96.6 F | OXYGEN SATURATION: 94 % | DIASTOLIC BLOOD PRESSURE: 92 MMHG | RESPIRATION RATE: 20 BRPM | WEIGHT: 200 LBS | HEART RATE: 92 BPM | BODY MASS INDEX: 28.63 KG/M2 | SYSTOLIC BLOOD PRESSURE: 150 MMHG

## 2025-06-10 DIAGNOSIS — F10.90 ALCOHOL USE: ICD-10-CM

## 2025-06-10 DIAGNOSIS — R55 NEAR SYNCOPE: Primary | ICD-10-CM

## 2025-06-10 DIAGNOSIS — R10.13 EPIGASTRIC PAIN: ICD-10-CM

## 2025-06-10 LAB
ALBUMIN SERPL BCP-MCNC: 4.4 G/DL (ref 3.2–4.9)
ALBUMIN/GLOB SERPL: 1.5 G/DL
ALP SERPL-CCNC: 106 U/L (ref 30–99)
ALT SERPL-CCNC: 70 U/L (ref 2–50)
ANION GAP SERPL CALC-SCNC: 16 MMOL/L (ref 7–16)
AST SERPL-CCNC: 51 U/L (ref 12–45)
BASOPHILS # BLD AUTO: 0.3 % (ref 0–1.8)
BASOPHILS # BLD: 0.03 K/UL (ref 0–0.12)
BILIRUB SERPL-MCNC: 0.8 MG/DL (ref 0.1–1.5)
BUN SERPL-MCNC: 8 MG/DL (ref 8–22)
CALCIUM ALBUM COR SERPL-MCNC: 8.7 MG/DL (ref 8.5–10.5)
CALCIUM SERPL-MCNC: 9 MG/DL (ref 8.5–10.5)
CHLORIDE SERPL-SCNC: 106 MMOL/L (ref 96–112)
CO2 SERPL-SCNC: 20 MMOL/L (ref 20–33)
CREAT SERPL-MCNC: 0.87 MG/DL (ref 0.5–1.4)
EKG IMPRESSION: NORMAL
EOSINOPHIL # BLD AUTO: 0.12 K/UL (ref 0–0.51)
EOSINOPHIL NFR BLD: 1.1 % (ref 0–6.9)
ERYTHROCYTE [DISTWIDTH] IN BLOOD BY AUTOMATED COUNT: 38 FL (ref 35.9–50)
ETHANOL BLD-MCNC: 16.7 MG/DL
GFR SERPLBLD CREATININE-BSD FMLA CKD-EPI: 115 ML/MIN/1.73 M 2
GLOBULIN SER CALC-MCNC: 2.9 G/DL (ref 1.9–3.5)
GLUCOSE SERPL-MCNC: 122 MG/DL (ref 65–99)
HCT VFR BLD AUTO: 51.3 % (ref 42–52)
HGB BLD-MCNC: 18.1 G/DL (ref 14–18)
IMM GRANULOCYTES # BLD AUTO: 0.06 K/UL (ref 0–0.11)
IMM GRANULOCYTES NFR BLD AUTO: 0.5 % (ref 0–0.9)
LIPASE SERPL-CCNC: 29 U/L (ref 11–82)
LYMPHOCYTES # BLD AUTO: 1.84 K/UL (ref 1–4.8)
LYMPHOCYTES NFR BLD: 16.2 % (ref 22–41)
MCH RBC QN AUTO: 30.1 PG (ref 27–33)
MCHC RBC AUTO-ENTMCNC: 35.3 G/DL (ref 32.3–36.5)
MCV RBC AUTO: 85.2 FL (ref 81.4–97.8)
MONOCYTES # BLD AUTO: 0.6 K/UL (ref 0–0.85)
MONOCYTES NFR BLD AUTO: 5.3 % (ref 0–13.4)
NEUTROPHILS # BLD AUTO: 8.71 K/UL (ref 1.82–7.42)
NEUTROPHILS NFR BLD: 76.6 % (ref 44–72)
NRBC # BLD AUTO: 0 K/UL
NRBC BLD-RTO: 0 /100 WBC (ref 0–0.2)
PLATELET # BLD AUTO: 275 K/UL (ref 164–446)
PMV BLD AUTO: 10.8 FL (ref 9–12.9)
POTASSIUM SERPL-SCNC: 3.5 MMOL/L (ref 3.6–5.5)
PROT SERPL-MCNC: 7.3 G/DL (ref 6–8.2)
RBC # BLD AUTO: 6.02 M/UL (ref 4.7–6.1)
SODIUM SERPL-SCNC: 142 MMOL/L (ref 135–145)
WBC # BLD AUTO: 11.4 K/UL (ref 4.8–10.8)

## 2025-06-10 PROCEDURE — 80053 COMPREHEN METABOLIC PANEL: CPT

## 2025-06-10 PROCEDURE — 93005 ELECTROCARDIOGRAM TRACING: CPT | Mod: TC | Performed by: EMERGENCY MEDICINE

## 2025-06-10 PROCEDURE — 700102 HCHG RX REV CODE 250 W/ 637 OVERRIDE(OP): Performed by: EMERGENCY MEDICINE

## 2025-06-10 PROCEDURE — 36415 COLL VENOUS BLD VENIPUNCTURE: CPT

## 2025-06-10 PROCEDURE — 99284 EMERGENCY DEPT VISIT MOD MDM: CPT

## 2025-06-10 PROCEDURE — 85025 COMPLETE CBC W/AUTO DIFF WBC: CPT

## 2025-06-10 PROCEDURE — 83690 ASSAY OF LIPASE: CPT

## 2025-06-10 PROCEDURE — A9270 NON-COVERED ITEM OR SERVICE: HCPCS | Performed by: EMERGENCY MEDICINE

## 2025-06-10 PROCEDURE — 82077 ASSAY SPEC XCP UR&BREATH IA: CPT

## 2025-06-10 RX ORDER — SUCRALFATE 1 G/1
1 TABLET ORAL
Qty: 56 TABLET | Refills: 0 | Status: SHIPPED | OUTPATIENT
Start: 2025-06-10 | End: 2025-06-24

## 2025-06-10 RX ORDER — FAMOTIDINE 20 MG/1
20 TABLET, FILM COATED ORAL ONCE
Status: COMPLETED | OUTPATIENT
Start: 2025-06-10 | End: 2025-06-10

## 2025-06-10 RX ORDER — OMEPRAZOLE 20 MG/1
20 CAPSULE, DELAYED RELEASE ORAL DAILY
Qty: 30 CAPSULE | Refills: 0 | Status: SHIPPED | OUTPATIENT
Start: 2025-06-10 | End: 2025-07-10

## 2025-06-10 RX ADMIN — LIDOCAINE HYDROCHLORIDE 30 ML: 20 SOLUTION OROPHARYNGEAL at 03:24

## 2025-06-10 RX ADMIN — FAMOTIDINE 20 MG: 20 TABLET, FILM COATED ORAL at 03:24

## 2025-06-10 NOTE — ED PROVIDER NOTES
ED Provider Note    CHIEF COMPLAINT  Chief Complaint   Patient presents with    Syncope     Patient at bedside of family member. Patient was feeling nauseated, then had near syncopal episode. Patient diaphoretic but alert.     EXTERNAL RECORDS REVIEWED  Outpatient Notes from 4/23/2025 when the patient had GERD and anorectal discomfort    HPI/ROS  LIMITATION TO HISTORY   Select: : None  OUTSIDE HISTORIAN(S):  none    Gera Benedict is a 35 y.o. male who presents to the emergency room as he was having some concerns regarding nauseousness and feeling very lightheaded.  He felt he was going to pass out and appeared very diaphoretic.  He was not having chest pains, he states that he drinks fairly regularly and he saw a family member who was in pain and had a large wound which made him feel unwell.  He had no headaches, says that he is no longer feeling lightheaded, he occasionally gets these episodes around the time that he gets some acid reflux which is made worse by his recent drinking.  He has not had any bloody bowel movements, he is denying any prior history of cardiac abnormalities, no familial history of early onset cardiac disease.    PAST MEDICAL HISTORY   has a past medical history of Chronic back pain.    SURGICAL HISTORY   has a past surgical history that includes irrigation & debridement ortho (Left, 6/29/2018).    FAMILY HISTORY  Family History   Problem Relation Age of Onset    Other Mother         unknown    Other Father         unknown     SOCIAL HISTORY  Social History     Tobacco Use    Smoking status: Some Days    Smokeless tobacco: Never    Tobacco comments:     avoid all tobacco products   Vaping Use    Vaping status: Never Used   Substance and Sexual Activity    Alcohol use: Yes     Alcohol/week: 2.0 oz     Types: 4 Cans of beer per week    Drug use: Yes     Types: Marijuana     Comment: marijuana    Sexual activity: Yes     Partners: Female     CURRENT MEDICATIONS  Home Medications        "Reviewed by Festus Holcomb R.N. (Registered Nurse) on 06/10/25 at 0249  Med List Status: Not Addressed     Medication Last Dose Status        Patient Anibal Taking any Medications                         ALLERGIES  Allergies[1]    PHYSICAL EXAM  VITAL SIGNS: BP (!) 150/94   Pulse 96   Temp 35.9 °C (96.6 °F) (Temporal)   Resp 20   Ht 1.778 m (5' 10\")   Wt 90.7 kg (200 lb)   SpO2 95%   BMI 28.70 kg/m²    Genl: M sitting in chair comfortably, speaking clearly, appears anxious but in no acute distress   Head: NC/AT   ENT: Mucous membranes moist, posterior pharynx clear, uvula midline, nares patent bilaterally   Pulmonary: Lungs are clear to auscultation bilaterally  Chest: No TTP  CV:  RRR, no murmur appreciated  Abdomen: soft, epigastric pain, no murphys sign, no pain with bilateral leg raise. ND; no rebound/guarding, no masses palpated, no HSM   : no CVA or suprapubic tenderness   Musculoskeletal: Pain free ROM of the neck. Moving upper and lower extremities in spontaneous and coordinated fashion  Neuro: A&Ox4 (person, place, time, situation), speech fluent, gait steady, no focal deficits appreciated, No cerebellar signs. Sensation is grossly intact in the distal upper and lower extremities.  5/5 strength in  and dorsiflexion/plantar flexion of the ankles  Psych: Patient has an appropriate affect and behavior  Skin: No rash or lesions.  No pallor or jaundice.  No cyanosis.  Warm and dry.     EKG/LABS  Results for orders placed or performed during the hospital encounter of 06/10/25   EKG (Now)   Result Value Ref Range    Report       [facility] Emergency Dept.    Test Date:  2025-06-10  Pt Name:    LINH GUTIERREZ               Department: ER  MRN:        [id number]                      Room:       BL 15 H  Gender:     Male                         Technician: [id number]  :        1990                   Requested By:VISHAL BARON  Order #:    [id number]                    Reading MD: West " MD Meliton    Measurements  Intervals                                Axis  Rate:       85                           P:          55  DE:         177                          QRS:        81  QRSD:       89                           T:          15  QT:         364  QTc:        433    Interpretive Statements  Sinus rhythm, Rate of 85, normal intervals and axis, no acute ST segment  elevations or depressions  No previous ECG available for comparison  Electronically Signed On 06- 03:44:13 PDT by Dave Coelho MD     I have independently interpreted this EKG    Labs Reviewed   CBC WITH DIFFERENTIAL - Abnormal; Notable for the following components:       Result Value    WBC 11.4 (*)     Hemoglobin 18.1 (*)     Neutrophils-Polys 76.60 (*)     Lymphocytes 16.20 (*)     Neutrophils (Absolute) 8.71 (*)     All other components within normal limits   COMP METABOLIC PANEL   LIPASE   DIAGNOSTIC ALCOHOL     RADIOLOGY/PROCEDURES   none    COURSE & MEDICAL DECISION MAKING    ASSESSMENT, COURSE AND PLAN  Care Narrative: Patient seen and evaluated for symptoms as described above.  The patient has a history of some alcohol use, describes having near syncopal event in the setting of seeing family member in acute distress and having seizure, acutely he presents with stable vital signs, otherwise nonfocal exam with near syncope and no loss of consciousness or other traumatic injury.  He does drink excessively but does not have any localizing tenderness beyond some epigastric pain.  Likely has some element of alcoholic gastritis, could have esophagitis.  He is nontoxic otherwise.  Lab work was obtained to make sure the patient does not have a significant transaminitis or pancreatitis.  Given GI cocktail and Pepcid, plan will be for outpatient follow-up pending results of the medical workup.    EKG shows no ectopy, no other concerning accessory pathways the patient has no concerning H&H changes that would necessitate need for  admission    At the time of signout, pending completion of lipase, alcohol, CMP.  Plan would be for symptomatic medications including Carafate, Pepcid.  Outpatient follow-up and referral has been placed.    Hydration: Based on the patient's presentation of Other npo status, near syncope the patient was given IV fluids. IV Hydration was used because oral hydration was not adequate alone. Upon recheck following hydration, the patient was improving.    FINAL DIAGNOSIS  1. Near syncope    2. Alcohol use    3. Epigastric pain      Electronically signed by: Meliton Acosta M.D., 6/10/2025 2:51 AM       [1] No Known Allergies

## 2025-06-10 NOTE — DISCHARGE SUMMARY
"  ED Observation Discharge Summary    Patient:Gera Benedict  Patient : 1990  Patient MRN: [id number]  Patient PCP: Pcp Pt States None    Admit Date: 6/10/2025  Discharge Date and Time: 06/10/25 4:38 AM  Discharge Diagnosis: Near syncope, alcoholic gastritis  Discharge Attending: Stephania Goodman M.D.  Discharge Service: ED Observation    ED Course  Gera is a 35 y.o. male who was evaluated at [facility] for evaluation of near syncope.  Initial EKG was within normal limits.  CBC was unremarkable.  He is signed out to myself to follow-up labs and response to treatment.  Metabolic panel returned is notable for elevated LFTs.  Patient is not having any abdominal tenderness, elevated LFTs likely related to alcohol use.  Electrolytes and renal function are unremarkable.  Lipase within normal limits ruling out pancreatitis.  Upon reassessment patient is feeling improved.  He is tolerating oral intake and ambulating with a steady unassisted gait.  At this time he is advised that his alcohol use is contributing to elevated LFTs and he should decrease this and follow-up with his primary care physician for recheck of labs.  He is amenable to this plan.  He is safe for discharge at this time.  He is given strict return precautions and discharged in stable condition.    Discharge Exam:  BP (!) 150/94   Pulse 96   Temp 35.9 °C (96.6 °F) (Temporal)   Resp 20   Ht 1.778 m (5' 10\")   Wt 90.7 kg (200 lb)   SpO2 95%   BMI 28.70 kg/m² .    Constitutional: Awake and alert. Nontoxic  HENT:  Grossly normal  Eyes: Grossly normal  Neck: Normal range of motion  Cardiovascular: Normal heart rate   Thorax & Lungs: No respiratory distress  Abdomen: Nontender  Skin:  No pathologic rash.   Extremities: Well perfused  Psychiatric: Affect normal    Labs  Results for orders placed or performed during the hospital encounter of 06/10/25   CBC WITH DIFFERENTIAL    Collection Time: 06/10/25  3:38 AM   Result Value " Ref Range    WBC 11.4 (H) 4.8 - 10.8 K/uL    RBC 6.02 4.70 - 6.10 M/uL    Hemoglobin 18.1 (H) 14.0 - 18.0 g/dL    Hematocrit 51.3 42.0 - 52.0 %    MCV 85.2 81.4 - 97.8 fL    MCH 30.1 27.0 - 33.0 pg    MCHC 35.3 32.3 - 36.5 g/dL    RDW 38.0 35.9 - 50.0 fL    Platelet Count 275 164 - 446 K/uL    MPV 10.8 9.0 - 12.9 fL    Neutrophils-Polys 76.60 (H) 44.00 - 72.00 %    Lymphocytes 16.20 (L) 22.00 - 41.00 %    Monocytes 5.30 0.00 - 13.40 %    Eosinophils 1.10 0.00 - 6.90 %    Basophils 0.30 0.00 - 1.80 %    Immature Granulocytes 0.50 0.00 - 0.90 %    Nucleated RBC 0.00 0.00 - 0.20 /100 WBC    Neutrophils (Absolute) 8.71 (H) 1.82 - 7.42 K/uL    Lymphs (Absolute) 1.84 1.00 - 4.80 K/uL    Monos (Absolute) 0.60 0.00 - 0.85 K/uL    Eos (Absolute) 0.12 0.00 - 0.51 K/uL    Baso (Absolute) 0.03 0.00 - 0.12 K/uL    Immature Granulocytes (abs) 0.06 0.00 - 0.11 K/uL    NRBC (Absolute) 0.00 K/uL   COMP METABOLIC PANEL    Collection Time: 06/10/25  3:38 AM   Result Value Ref Range    Sodium 142 135 - 145 mmol/L    Potassium 3.5 (L) 3.6 - 5.5 mmol/L    Chloride 106 96 - 112 mmol/L    Co2 20 20 - 33 mmol/L    Anion Gap 16.0 7.0 - 16.0    Glucose 122 (H) 65 - 99 mg/dL    Bun 8 8 - 22 mg/dL    Creatinine 0.87 0.50 - 1.40 mg/dL    Calcium 9.0 8.5 - 10.5 mg/dL    Correct Calcium 8.7 8.5 - 10.5 mg/dL    AST(SGOT) 51 (H) 12 - 45 U/L    ALT(SGPT) 70 (H) 2 - 50 U/L    Alkaline Phosphatase 106 (H) 30 - 99 U/L    Total Bilirubin 0.8 0.1 - 1.5 mg/dL    Albumin 4.4 3.2 - 4.9 g/dL    Total Protein 7.3 6.0 - 8.2 g/dL    Globulin 2.9 1.9 - 3.5 g/dL    A-G Ratio 1.5 g/dL   LIPASE    Collection Time: 06/10/25  3:38 AM   Result Value Ref Range    Lipase 29 11 - 82 U/L   DIAGNOSTIC ALCOHOL    Collection Time: 06/10/25  3:38 AM   Result Value Ref Range    Diagnostic Alcohol 16.7 (H) <10.1 mg/dL   ESTIMATED GFR    Collection Time: 06/10/25  3:38 AM   Result Value Ref Range    GFR (CKD-EPI) 115 >60 mL/min/1.73 m 2   EKG (Now)    Collection Time: 06/10/25   3:44 AM   Result Value Ref Range    Report       [facility] Emergency Dept.    Test Date:  2025-06-10  Pt Name:    LINH GUTIERREZ               Department: ER  MRN:        [id number]                      Room:       BL 15 H  Gender:     Male                         Technician: [id number]  :        1990                   Requested By:VISHAL BARON  Order #:    [id number]                    Reading MD: Dave Coelho MD    Measurements  Intervals                                Axis  Rate:       85                           P:          55  KS:         177                          QRS:        81  QRSD:       89                           T:          15  QT:         364  QTc:        433    Interpretive Statements  Sinus rhythm, Rate of 85, normal intervals and axis, no acute ST segment  elevations or depressions  No previous ECG available for comparison  Electronically Signed On 06- 03:44:13 PDT by Dave Coelho MD         Radiology  No orders to display       Medications:   New Prescriptions    OMEPRAZOLE (PRILOSEC) 20 MG DELAYED-RELEASE CAPSULE    Take 1 Capsule by mouth every day for 30 days.    SUCRALFATE (CARAFATE) 1 GM TAB    Take 1 Tablet by mouth 4 Times a Day,Before Meals and at Bedtime for 14 days.       My final assessment includes alcoholic gastritis, near syncope  Upon Reevaluation, the patient's condition has: Improved; and will be discharged.    Patient discharged from ED Observation status at 4:38 AM on 6/10/2025    Total time spent on this ED Observation discharge encounter is < 30 Minutes    Electronically signed by: Stephania Goodman M.D., 6/10/2025 4:38 AM

## 2025-06-10 NOTE — ED NOTES
Patient ambulatory without assistance approximately 50 feet, denies dizziness, denies nausea, denies light headedness.

## 2025-06-10 NOTE — ED TRIAGE NOTES
"Chief Complaint   Patient presents with    Syncope     Patient at bedside of family member. Patient was feeling nauseated, then had near syncopal episode. Patient diaphoretic but alert.     Patient endorses drinking a bottle of whiskey tonight, last drink around 4 hours ago.      Ht 1.778 m (5' 10\")   Wt 90.7 kg (200 lb)   BMI 28.70 kg/m²   "

## 2025-07-23 ENCOUNTER — TELEPHONE (OUTPATIENT)
Dept: HEALTH INFORMATION MANAGEMENT | Facility: OTHER | Age: 35
End: 2025-07-23

## (undated) DEVICE — SUTURE GENERAL

## (undated) DEVICE — ELECTRODE 850 FOAM ADHESIVE - HYDROGEL RADIOTRNSPRNT (50/PK)

## (undated) DEVICE — DRAPE LOWER EXTREMETY - (6/CA)

## (undated) DEVICE — SENSOR SPO2 NEO LNCS ADHESIVE (20/BX) SEE USER NOTES

## (undated) DEVICE — NEPTUNE 4 PORT MANIFOLD - (20/PK)

## (undated) DEVICE — GLOVE BIOGEL INDICATOR SZ 8 SURGICAL PF LTX - (50/BX 4BX/CA)

## (undated) DEVICE — GOWN WARMING STANDARD FLEX - (30/CA)

## (undated) DEVICE — KIT ROOM DECONTAMINATION

## (undated) DEVICE — PADDING CAST 4 IN STERILE - 4 X 4 YDS (24/CA)

## (undated) DEVICE — SET EXTENSION WITH 2 PORTS (48EA/CA) ***PART #2C8610 IS A SUBSTITUTE*****

## (undated) DEVICE — CHLORAPREP 26 ML APPLICATOR - ORANGE TINT(25/CA)

## (undated) DEVICE — BANDAGE ELASTIC 4 HONEYCOMB - 4"X5YD LF (20/CA)"

## (undated) DEVICE — BANDAGE ELASTIC 6 HONEYCOMB - 6X5YD LF (20/CA)"

## (undated) DEVICE — ELECTRODE DUAL RETURN W/ CORD - (50/PK)

## (undated) DEVICE — KIT ANESTHESIA W/CIRCUIT & 3/LT BAG W/FILTER (20EA/CA)

## (undated) DEVICE — TUBING CLEARLINK DUO-VENT - C-FLO (48EA/CA)

## (undated) DEVICE — WATER IRRIG. STER. 1500 ML - (9/CA)

## (undated) DEVICE — SODIUM CHL IRRIGATION 0.9% 1000ML (12EA/CA)

## (undated) DEVICE — PACK MAJOR ORTHO - (2EA/CA)

## (undated) DEVICE — MASK ANESTHESIA ADULT  - (100/CA)

## (undated) DEVICE — GLOVE BIOGEL SZ 7.5 SURGICAL PF LTX - (50PR/BX 4BX/CA)

## (undated) DEVICE — MASK, LARYNGEAL AIRWAY #4

## (undated) DEVICE — SUTURE 2-0 VICRYL PLUS CT-1 - 8 X 18 INCH(12/BX)

## (undated) DEVICE — SUTURE 0 VICRYL PLUS CT-1 - 8 X 18 INCH (12/BX)

## (undated) DEVICE — PROTECTOR ULNA NERVE - (36PR/CA)

## (undated) DEVICE — SLEEVE, VASO, THIGH, MED

## (undated) DEVICE — PADDING CAST 6 IN STERILE - 6 X 4 YDS (24/CA)

## (undated) DEVICE — HEAD HOLDER JUNIOR/ADULT

## (undated) DEVICE — SUCTION INSTRUMENT YANKAUER BULBOUS TIP W/O VENT (50EA/CA)

## (undated) DEVICE — BLADE SURGICAL #15 - (50/BX 3BX/CA)

## (undated) DEVICE — SUTURE 2-0 MONOCRYL CT-1

## (undated) DEVICE — LACTATED RINGERS INJ 1000 ML - (14EA/CA 60CA/PF)

## (undated) DEVICE — SET LEADWIRE 5 LEAD BEDSIDE DISPOSABLE ECG (1SET OF 5/EA)

## (undated) DEVICE — SUTURE 1 VICRYL PLUS CTX - 36 INCH (36/BX)

## (undated) DEVICE — CANISTER SUCTION 3000ML MECHANICAL FILTER AUTO SHUTOFF MEDI-VAC NONSTERILE LF DISP  (40EA/CA)

## (undated) DEVICE — MASK AIRWAY FLEXIBLE SINGLE-USE SIZE 5 ADULTS (10EA/BX)